# Patient Record
Sex: FEMALE | Race: WHITE | NOT HISPANIC OR LATINO | Employment: OTHER | URBAN - METROPOLITAN AREA
[De-identification: names, ages, dates, MRNs, and addresses within clinical notes are randomized per-mention and may not be internally consistent; named-entity substitution may affect disease eponyms.]

---

## 2020-12-21 ENCOUNTER — TELEPHONE (OUTPATIENT)
Dept: UROLOGY | Facility: CLINIC | Age: 80
End: 2020-12-21

## 2020-12-21 NOTE — TELEPHONE ENCOUNTER
----- Message from Connie Hernandez sent at 12/21/2020 10:22 AM CST -----  Regarding: External Urology Referral  Good morning,    Dr. Ora Burger referring patient to Dr. Main at [facility] for primary clear cell carcinoma of left kidney. Referral scanned into media mgr. Can you review and assist with scheduling?      Connie Stoddard

## 2020-12-21 NOTE — TELEPHONE ENCOUNTER
Called pt informed her that our first available appointment is 12/29/2020 with Dr Main as a new patient. Pt ok with appointment time and date.

## 2020-12-29 ENCOUNTER — OFFICE VISIT (OUTPATIENT)
Dept: UROLOGY | Facility: CLINIC | Age: 80
End: 2020-12-29
Payer: MEDICARE

## 2020-12-29 VITALS
BODY MASS INDEX: 33.32 KG/M2 | SYSTOLIC BLOOD PRESSURE: 130 MMHG | HEIGHT: 65 IN | TEMPERATURE: 97 F | OXYGEN SATURATION: 95 % | DIASTOLIC BLOOD PRESSURE: 60 MMHG | WEIGHT: 200 LBS | HEART RATE: 82 BPM

## 2020-12-29 DIAGNOSIS — N95.2 ATROPHIC VAGINITIS: ICD-10-CM

## 2020-12-29 DIAGNOSIS — R31.0 GROSS HEMATURIA: Primary | ICD-10-CM

## 2020-12-29 DIAGNOSIS — C64.9 RENAL CELL CARCINOMA, UNSPECIFIED LATERALITY: ICD-10-CM

## 2020-12-29 PROCEDURE — 1159F PR MEDICATION LIST DOCUMENTED IN MEDICAL RECORD: ICD-10-PCS | Mod: ,,, | Performed by: UROLOGY

## 2020-12-29 PROCEDURE — 99213 OFFICE O/P EST LOW 20 MIN: CPT | Mod: ,,, | Performed by: UROLOGY

## 2020-12-29 PROCEDURE — 1101F PR PT FALLS ASSESS DOC 0-1 FALLS W/OUT INJ PAST YR: ICD-10-PCS | Mod: CPTII,,, | Performed by: UROLOGY

## 2020-12-29 PROCEDURE — 99213 PR OFFICE/OUTPT VISIT, EST, LEVL III, 20-29 MIN: ICD-10-PCS | Mod: ,,, | Performed by: UROLOGY

## 2020-12-29 PROCEDURE — 88112 PR  CYTOPATH, CELL ENHANCE TECH: ICD-10-PCS | Mod: 26,,, | Performed by: PATHOLOGY

## 2020-12-29 PROCEDURE — 88112 CYTOPATH CELL ENHANCE TECH: CPT | Mod: 26,,, | Performed by: PATHOLOGY

## 2020-12-29 PROCEDURE — 99999 PR PBB SHADOW E&M-EST. PATIENT-LVL IV: ICD-10-PCS | Mod: PBBFAC,,, | Performed by: UROLOGY

## 2020-12-29 PROCEDURE — 1101F PT FALLS ASSESS-DOCD LE1/YR: CPT | Mod: CPTII,,, | Performed by: UROLOGY

## 2020-12-29 PROCEDURE — 1126F AMNT PAIN NOTED NONE PRSNT: CPT | Mod: ,,, | Performed by: UROLOGY

## 2020-12-29 PROCEDURE — 3288F PR FALLS RISK ASSESSMENT DOCUMENTED: ICD-10-PCS | Mod: CPTII,,, | Performed by: UROLOGY

## 2020-12-29 PROCEDURE — 3288F FALL RISK ASSESSMENT DOCD: CPT | Mod: CPTII,,, | Performed by: UROLOGY

## 2020-12-29 PROCEDURE — 1159F MED LIST DOCD IN RCRD: CPT | Mod: ,,, | Performed by: UROLOGY

## 2020-12-29 PROCEDURE — 1126F PR PAIN SEVERITY QUANTIFIED, NO PAIN PRESENT: ICD-10-PCS | Mod: ,,, | Performed by: UROLOGY

## 2020-12-29 PROCEDURE — 99999 PR PBB SHADOW E&M-EST. PATIENT-LVL IV: CPT | Mod: PBBFAC,,, | Performed by: UROLOGY

## 2020-12-29 PROCEDURE — 88112 CYTOPATH CELL ENHANCE TECH: CPT | Performed by: PATHOLOGY

## 2020-12-29 RX ORDER — PRAMIPEXOLE DIHYDROCHLORIDE 0.12 MG/1
2 TABLET ORAL 3 TIMES DAILY
COMMUNITY
Start: 2020-10-29

## 2020-12-29 RX ORDER — GABAPENTIN 300 MG/1
300 CAPSULE ORAL 3 TIMES DAILY
COMMUNITY
Start: 2020-10-29

## 2020-12-29 RX ORDER — ACETAMINOPHEN AND CODEINE PHOSPHATE 300; 30 MG/1; MG/1
1 TABLET ORAL EVERY 6 HOURS PRN
COMMUNITY
Start: 2020-12-17

## 2020-12-29 RX ORDER — ROSUVASTATIN CALCIUM 40 MG/1
40 TABLET, COATED ORAL DAILY
COMMUNITY
Start: 2020-10-26

## 2020-12-29 RX ORDER — INSULIN GLARGINE 100 [IU]/ML
100 INJECTION, SOLUTION SUBCUTANEOUS NIGHTLY
COMMUNITY
Start: 2020-11-21

## 2020-12-29 RX ORDER — OMEPRAZOLE 40 MG/1
40 CAPSULE, DELAYED RELEASE ORAL DAILY
COMMUNITY
Start: 2020-10-23

## 2020-12-29 RX ORDER — LEVOTHYROXINE SODIUM 75 UG/1
75 TABLET ORAL DAILY
COMMUNITY
Start: 2020-08-04

## 2020-12-29 RX ORDER — AMLODIPINE BESYLATE 10 MG/1
10 TABLET ORAL DAILY
COMMUNITY
Start: 2020-04-01

## 2020-12-29 RX ORDER — SITAGLIPTIN 100 MG/1
1 TABLET, FILM COATED ORAL DAILY
COMMUNITY
Start: 2020-10-29

## 2020-12-29 RX ORDER — ISOSORBIDE MONONITRATE 30 MG/1
30 TABLET, EXTENDED RELEASE ORAL DAILY
COMMUNITY
Start: 2020-07-20

## 2020-12-29 RX ORDER — NEBIVOLOL HYDROCHLORIDE 5 MG/1
5 TABLET ORAL DAILY
COMMUNITY
Start: 2020-10-29 | End: 2023-01-11

## 2020-12-29 NOTE — LETTER
December 30, 2020      Ora Burger MD  [address]  [address]  [place] [address]           [place] [facility]  [address] [place] [address]  Phone: [phone]          Patient: Adriana Lopez   MR Number: [id number]   YOB: 1940   Date of Visit: 12/29/2020       Dear Dr. Ora Burger:    Thank you for referring Adriana Lopez to me for evaluation. Attached you will find relevant portions of my assessment and plan of care.    If you have questions, please do not hesitate to call me. I look forward to following Adriana Lopez along with you.    Sincerely,    Alanis Main MD    Enclosure  CC:  No Recipients    If you would like to receive this communication electronically, please contact [email or web address] or [phone] to request more information on [facility] [employer] access.    For providers and/or their staff who would like to refer a patient to [facility], please contact us through our one-stop-shop provider referral line, [facility], at [phone].    If you feel you have received this communication in error or would no longer like to receive these types of communications, please e-mail [email or web address]

## 2020-12-29 NOTE — PROGRESS NOTES
Chief Complaint   Patient presents with    referal/ clear cell caricinoma of the LT kidney         History of Present Illness:   Adriana Lopez is a 80 y.o. female here for follow up.   She underwent CT/cysto for gross hematuria 6/2020. Started on premarin cream for atrophic vaginitis.   Was found to be anemic by Dr. Lerner and is having an MRI of her liver soon. Using vag est as prescribed.   She denies any gross hematuria. Only having abdominal pain from hernia, but not otherwise.   No gross hematuria or flank pain. No dysuria.     Left partial nephrectomy 2012 at [facility] by Dr. 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    Review of Systems:   Constitutional: Pt denies fever, chills, change in appetite or unintentional weight loss  HENT: No drooling, loss of hearing, mouth sores, facial swelling  Eyes: No photophobia, visual disturbance or eye pain  Respiratory: No cough, dyspnea, wheezing  Cardiovascular: No chest pain, palpitations or leg swelling  GI: No constipation, diarrhea, nausea, vomiting, melena or hematochezia  : No genital lesions, discharge, nocturnal enuresis  Endocrine: No polydipsia, polyphagia, heat or cold intolerance  Musculoskeletal: +back pain  Integument: No color change, rash or wounds  Neurological: No seizures, speech difficulty or tremors  Psychiatric: No confusion, self-harm or Hallucinations    Current Outpatient Medications   Medication Sig Dispense Refill    acetaminophen-codeine 300-30mg (TYLENOL #3) 300-30 mg Tab Take 1 tablet by mouth every 6 (six) hours as needed.      amLODIPine (NORVASC) 10 MG tablet Take 10 mg by mouth once daily.      BYSTOLIC 5 mg Tab Take 5 mg by mouth once daily.      gabapentin (NEURONTIN) 300 MG capsule Take 300 mg by mouth 3 (three) times daily.      isosorbide mononitrate (IMDUR) 30 MG 24 hr tablet Take 30 mg by mouth once daily.      JANUVIA 100 mg Tab Take 1 tablet by mouth once daily.      LANTUS SOLOSTAR U-100 INSULIN glargine 100  units/mL (3mL) SubQ pen Inject 100 Units into the skin nightly.      levothyroxine (SYNTHROID) 75 MCG tablet Take 75 mcg by mouth once daily.      omeprazole (PRILOSEC) 40 MG capsule Take 40 mg by mouth once daily.      pramipexole (MIRAPEX) 0.125 MG tablet Take 2 tablets by mouth 3 (three) times daily. 0.125 mg tab      rosuvastatin (CRESTOR) 40 MG Tab Take 40 mg by mouth once daily.       No current facility-administered medications for this visit.        Review of patient's allergies indicates:   Allergen Reactions    Canagliflozin Itching    Iron      Stomach issues    Itraconazole Diarrhea    Nitrofurantoin     Povidone-iodine     Shellfish derived     Alendronate Nausea And Vomiting    Codeine Nausea And Vomiting    Diphenhydramine hcl Anxiety    Hydrocodone Nausea And Vomiting    Iodine and iodide containing products Itching and Rash    Latex, natural rubber Rash    Macrolide antibiotics Nausea Only    Metformin Nausea And Vomiting    Oxycodone-acetaminophen Nausea And Vomiting    Penicillins Nausea And Vomiting    Raloxifene Nausea Only    Ropinirole Nausea Only    Sulfa (sulfonamide antibiotics) Nausea And Vomiting       Past medical, family, and social history reviewed as documented in chart with pertinent positive medical, family, and social history detailed in HPI.    Physical Exam  Vitals:    12/29/20 1336   BP: 130/60   Pulse: 82   Temp: 97.2 °F (36.2 °C)       General: Well-developed, Well Nourished in No acute distress  HEENT: Normocephalic, Atraumatic, Extraocular Movements intact  Neck: Supple, trachea midline, no cervical or supraclavicular lymphadenopathy  Respirations: Even and unlabored  Back: Midline spine without tenderness, no CVA tenderness  Abdomen: Soft, Non-tender, non-distended, palpable tip of the liver, no rebound or guarding, no palpable masses  Extremities: Moves all equally, atraumatic, no clubbing, cyanosis or edema  Skin: warm and dry  Psych: normal  affect  Neuro: Alert and oriented x 3, Cranial nerves II-XII grossly intact      Urinalysis  Trace protein, negative for LE, nit and blood    Assesment:   1. Gross hematuria  Cytology, Urine    CANCELED: Cytology, Urine   2. Atrophic vaginitis     3. Renal cell carcinoma, unspecified laterality           Plan:  Gross hematuria  -     Cytology, Urine  -     Cancel: Cytology, Urine    Atrophic vaginitis    Renal cell carcinoma, unspecified laterality    continue vaginal estrogen  Urine for cytology  Follow up in about 6 months (around 6/29/2021).

## 2020-12-30 PROBLEM — I15.2 HYPERTENSION ASSOCIATED WITH DIABETES: Status: ACTIVE | Noted: 2020-12-30

## 2020-12-30 PROBLEM — D69.6 THROMBOCYTOPENIA: Status: ACTIVE | Noted: 2019-10-09

## 2020-12-30 PROBLEM — E11.42 DIABETIC PERIPHERAL NEUROPATHY ASSOCIATED WITH TYPE 2 DIABETES MELLITUS: Status: ACTIVE | Noted: 2020-12-30

## 2020-12-30 PROBLEM — Z23 NEED FOR INFLUENZA VACCINATION: Status: ACTIVE | Noted: 2017-12-07

## 2020-12-30 PROBLEM — Z79.4 INSULIN LONG-TERM USE: Status: ACTIVE | Noted: 2017-08-14

## 2020-12-30 PROBLEM — E11.59 HYPERTENSION ASSOCIATED WITH DIABETES: Status: ACTIVE | Noted: 2020-12-30

## 2020-12-30 PROBLEM — B36.1: Status: ACTIVE | Noted: 2017-04-17

## 2020-12-30 PROBLEM — K31.7 POLYP OF STOMACH AND DUODENUM: Status: ACTIVE | Noted: 2019-09-26

## 2020-12-30 PROBLEM — E11.29 MICROALBUMINURIA DUE TO TYPE 2 DIABETES MELLITUS: Status: ACTIVE | Noted: 2017-12-07

## 2020-12-30 PROBLEM — B37.9: Status: ACTIVE | Noted: 2017-02-06

## 2020-12-30 PROBLEM — G47.00 INSOMNIA: Status: ACTIVE | Noted: 2017-05-09

## 2020-12-30 PROBLEM — Z02.89 PAIN MEDICATION AGREEMENT SIGNED: Status: ACTIVE | Noted: 2017-08-14

## 2020-12-30 PROBLEM — B49 MYCOSIS: Status: ACTIVE | Noted: 2017-03-17

## 2020-12-30 PROBLEM — S22.080A COMPRESSION FRACTURE OF T12 VERTEBRA: Status: ACTIVE | Noted: 2020-05-28

## 2020-12-30 PROBLEM — R80.9 MICROALBUMINURIA DUE TO TYPE 2 DIABETES MELLITUS: Status: ACTIVE | Noted: 2017-12-07

## 2020-12-30 PROBLEM — K25.3 ANTRAL ULCER, ACUTE: Status: ACTIVE | Noted: 2019-02-28

## 2020-12-30 PROBLEM — E53.8 LOW SERUM VITAMIN B12: Status: ACTIVE | Noted: 2019-02-12

## 2020-12-30 PROBLEM — C82.09: Status: ACTIVE | Noted: 2017-09-30

## 2020-12-30 PROBLEM — G25.81 RESTLESS LEG: Status: ACTIVE | Noted: 2017-04-19

## 2020-12-30 PROBLEM — M85.88 OSTEOPENIA OF LUMBAR SPINE: Status: ACTIVE | Noted: 2018-01-07

## 2020-12-30 PROBLEM — Z79.82 ON ASPIRIN AT HOME: Status: ACTIVE | Noted: 2017-09-30

## 2020-12-30 PROBLEM — R91.8 PULMONARY NODULES: Status: ACTIVE | Noted: 2017-02-02

## 2020-12-30 PROBLEM — K74.69 OTHER CIRRHOSIS OF LIVER: Status: ACTIVE | Noted: 2019-02-28

## 2020-12-30 PROBLEM — R06.00 DYSPNEA: Status: ACTIVE | Noted: 2018-11-02

## 2020-12-30 PROBLEM — G47.33 OSA (OBSTRUCTIVE SLEEP APNEA): Status: ACTIVE | Noted: 2020-12-30

## 2020-12-30 PROBLEM — R26.81 GAIT INSTABILITY: Status: ACTIVE | Noted: 2017-02-02

## 2020-12-30 PROBLEM — I34.0 NON-RHEUMATIC MITRAL REGURGITATION: Status: ACTIVE | Noted: 2019-05-13

## 2020-12-30 PROBLEM — E66.01 MORBIDLY OBESE: Status: ACTIVE | Noted: 2019-10-09

## 2020-12-30 PROBLEM — E03.8 ADULT ONSET HYPOTHYROIDISM: Status: ACTIVE | Noted: 2020-12-30

## 2020-12-30 LAB — FINAL PATHOLOGIC DIAGNOSIS: NORMAL

## 2021-05-15 ENCOUNTER — OUTSIDE PLACE OF SERVICE (OUTPATIENT)
Dept: CARDIOLOGY | Facility: CLINIC | Age: 81
End: 2021-05-15
Payer: MEDICARE

## 2021-05-15 PROCEDURE — 93010 PR ELECTROCARDIOGRAM REPORT: ICD-10-PCS | Mod: ,,, | Performed by: INTERNAL MEDICINE

## 2021-05-15 PROCEDURE — 93010 ELECTROCARDIOGRAM REPORT: CPT | Mod: ,,, | Performed by: INTERNAL MEDICINE

## 2021-05-17 ENCOUNTER — OUTSIDE PLACE OF SERVICE (OUTPATIENT)
Dept: CARDIOLOGY | Facility: CLINIC | Age: 81
End: 2021-05-17
Payer: MEDICARE

## 2021-05-17 PROCEDURE — 99222 1ST HOSP IP/OBS MODERATE 55: CPT | Mod: ,,, | Performed by: INTERNAL MEDICINE

## 2021-05-17 PROCEDURE — 99222 PR INITIAL HOSPITAL CARE,LEVL II: ICD-10-PCS | Mod: ,,, | Performed by: INTERNAL MEDICINE

## 2021-06-29 ENCOUNTER — OFFICE VISIT (OUTPATIENT)
Dept: UROLOGY | Facility: CLINIC | Age: 81
End: 2021-06-29
Payer: MEDICARE

## 2021-06-29 VITALS
DIASTOLIC BLOOD PRESSURE: 78 MMHG | HEIGHT: 65 IN | WEIGHT: 166.69 LBS | BODY MASS INDEX: 27.77 KG/M2 | SYSTOLIC BLOOD PRESSURE: 140 MMHG

## 2021-06-29 DIAGNOSIS — R31.0 GROSS HEMATURIA: Primary | ICD-10-CM

## 2021-06-29 DIAGNOSIS — N30.01 ACUTE CYSTITIS WITH HEMATURIA: ICD-10-CM

## 2021-06-29 PROCEDURE — 87088 URINE BACTERIA CULTURE: CPT | Performed by: UROLOGY

## 2021-06-29 PROCEDURE — 99999 PR PBB SHADOW E&M-EST. PATIENT-LVL III: ICD-10-PCS | Mod: PBBFAC,,, | Performed by: UROLOGY

## 2021-06-29 PROCEDURE — 1159F MED LIST DOCD IN RCRD: CPT | Mod: S$GLB,,, | Performed by: UROLOGY

## 2021-06-29 PROCEDURE — 99999 PR PBB SHADOW E&M-EST. PATIENT-LVL III: CPT | Mod: PBBFAC,,, | Performed by: UROLOGY

## 2021-06-29 PROCEDURE — 99213 OFFICE O/P EST LOW 20 MIN: CPT | Mod: S$GLB,,, | Performed by: UROLOGY

## 2021-06-29 PROCEDURE — 87086 URINE CULTURE/COLONY COUNT: CPT | Performed by: UROLOGY

## 2021-06-29 PROCEDURE — 1125F AMNT PAIN NOTED PAIN PRSNT: CPT | Mod: S$GLB,,, | Performed by: UROLOGY

## 2021-06-29 PROCEDURE — 99213 PR OFFICE/OUTPT VISIT, EST, LEVL III, 20-29 MIN: ICD-10-PCS | Mod: S$GLB,,, | Performed by: UROLOGY

## 2021-06-29 PROCEDURE — 87186 SC STD MICRODIL/AGAR DIL: CPT | Performed by: UROLOGY

## 2021-06-29 PROCEDURE — 87077 CULTURE AEROBIC IDENTIFY: CPT | Performed by: UROLOGY

## 2021-06-29 PROCEDURE — 1159F PR MEDICATION LIST DOCUMENTED IN MEDICAL RECORD: ICD-10-PCS | Mod: S$GLB,,, | Performed by: UROLOGY

## 2021-06-29 PROCEDURE — 1125F PR PAIN SEVERITY QUANTIFIED, PAIN PRESENT: ICD-10-PCS | Mod: S$GLB,,, | Performed by: UROLOGY

## 2021-06-29 RX ORDER — PRASUGREL 10 MG/1
TABLET, FILM COATED ORAL
COMMUNITY
End: 2023-01-11

## 2021-06-29 RX ORDER — PANTOPRAZOLE SODIUM 40 MG/1
1 TABLET, DELAYED RELEASE ORAL DAILY
COMMUNITY
Start: 2021-05-31 | End: 2023-01-11

## 2021-06-29 RX ORDER — PEN NEEDLE, DIABETIC 30 GX3/16"
NEEDLE, DISPOSABLE MISCELLANEOUS
COMMUNITY
Start: 2021-04-22

## 2021-06-29 RX ORDER — ISAVUCONAZONIUM SULFATE 186 MG/1
CAPSULE ORAL
COMMUNITY
End: 2023-01-11

## 2021-06-29 RX ORDER — TEMAZEPAM 15 MG/1
CAPSULE ORAL
COMMUNITY

## 2021-06-29 RX ORDER — LIDOCAINE 50 MG/G
PATCH TOPICAL
COMMUNITY
Start: 2021-01-20

## 2021-06-29 RX ORDER — RALOXIFENE HYDROCHLORIDE 60 MG/1
TABLET, FILM COATED ORAL
COMMUNITY

## 2021-06-29 RX ORDER — CEFDINIR 300 MG/1
300 CAPSULE ORAL 2 TIMES DAILY
Qty: 14 CAPSULE | Refills: 0 | Status: SHIPPED | OUTPATIENT
Start: 2021-06-29 | End: 2021-07-06

## 2021-06-29 RX ORDER — ONDANSETRON 8 MG/1
TABLET, ORALLY DISINTEGRATING ORAL
COMMUNITY

## 2021-06-29 RX ORDER — AZATHIOPRINE 50 MG/1
100 TABLET ORAL
COMMUNITY
Start: 2021-06-01 | End: 2023-01-11

## 2021-06-29 RX ORDER — METOPROLOL SUCCINATE 100 MG/1
1 TABLET, EXTENDED RELEASE ORAL DAILY
COMMUNITY
Start: 2021-06-01 | End: 2023-01-11

## 2021-06-29 RX ORDER — TALC
1 POWDER (GRAM) TOPICAL NIGHTLY PRN
COMMUNITY
Start: 2021-05-04

## 2021-06-29 RX ORDER — PREDNISONE 20 MG/1
TABLET ORAL
COMMUNITY

## 2021-06-29 RX ORDER — NITROGLYCERIN 0.4 MG/1
TABLET SUBLINGUAL
COMMUNITY
Start: 2021-01-12

## 2021-06-29 RX ORDER — POTASSIUM CHLORIDE 1500 MG/1
TABLET, EXTENDED RELEASE ORAL
COMMUNITY

## 2021-06-29 RX ORDER — LORAZEPAM 1 MG/1
TABLET ORAL
COMMUNITY

## 2021-06-29 RX ORDER — INSULIN LISPRO 100 [IU]/ML
INJECTION, SOLUTION INTRAVENOUS; SUBCUTANEOUS
COMMUNITY
Start: 2021-05-04 | End: 2023-01-11

## 2021-06-29 RX ORDER — FUROSEMIDE 20 MG/1
TABLET ORAL
COMMUNITY
Start: 2021-05-31

## 2021-06-29 RX ORDER — FOLIC ACID 1 MG/1
1 TABLET ORAL DAILY
COMMUNITY
Start: 2021-06-01 | End: 2023-01-11

## 2021-06-29 RX ORDER — POLYETHYLENE GLYCOL 3350 17 G/17G
POWDER, FOR SOLUTION ORAL
COMMUNITY

## 2021-06-29 RX ORDER — INSULIN ASPART 100 [IU]/ML
INJECTION, SOLUTION INTRAVENOUS; SUBCUTANEOUS
COMMUNITY

## 2021-06-29 RX ORDER — PROMETHAZINE HYDROCHLORIDE 12.5 MG/1
TABLET ORAL
COMMUNITY

## 2021-07-02 LAB — BACTERIA UR CULT: ABNORMAL

## 2021-12-30 ENCOUNTER — OFFICE VISIT (OUTPATIENT)
Dept: UROLOGY | Facility: CLINIC | Age: 81
End: 2021-12-30
Payer: MEDICARE

## 2021-12-30 VITALS
WEIGHT: 167.31 LBS | BODY MASS INDEX: 28.28 KG/M2 | DIASTOLIC BLOOD PRESSURE: 70 MMHG | SYSTOLIC BLOOD PRESSURE: 130 MMHG

## 2021-12-30 DIAGNOSIS — N95.2 ATROPHIC VAGINITIS: ICD-10-CM

## 2021-12-30 DIAGNOSIS — R31.0 GROSS HEMATURIA: ICD-10-CM

## 2021-12-30 DIAGNOSIS — C64.2 RENAL CELL CARCINOMA OF LEFT KIDNEY: Primary | ICD-10-CM

## 2021-12-30 PROCEDURE — 3075F SYST BP GE 130 - 139MM HG: CPT | Mod: CPTII,S$GLB,, | Performed by: UROLOGY

## 2021-12-30 PROCEDURE — 3075F PR MOST RECENT SYSTOLIC BLOOD PRESS GE 130-139MM HG: ICD-10-PCS | Mod: CPTII,S$GLB,, | Performed by: UROLOGY

## 2021-12-30 PROCEDURE — 1159F MED LIST DOCD IN RCRD: CPT | Mod: CPTII,S$GLB,, | Performed by: UROLOGY

## 2021-12-30 PROCEDURE — 1101F PR PT FALLS ASSESS DOC 0-1 FALLS W/OUT INJ PAST YR: ICD-10-PCS | Mod: CPTII,S$GLB,, | Performed by: UROLOGY

## 2021-12-30 PROCEDURE — 1159F PR MEDICATION LIST DOCUMENTED IN MEDICAL RECORD: ICD-10-PCS | Mod: CPTII,S$GLB,, | Performed by: UROLOGY

## 2021-12-30 PROCEDURE — 3078F PR MOST RECENT DIASTOLIC BLOOD PRESSURE < 80 MM HG: ICD-10-PCS | Mod: CPTII,S$GLB,, | Performed by: UROLOGY

## 2021-12-30 PROCEDURE — 99213 PR OFFICE/OUTPT VISIT, EST, LEVL III, 20-29 MIN: ICD-10-PCS | Mod: S$GLB,,, | Performed by: UROLOGY

## 2021-12-30 PROCEDURE — 99999 PR PBB SHADOW E&M-EST. PATIENT-LVL IV: ICD-10-PCS | Mod: PBBFAC,,, | Performed by: UROLOGY

## 2021-12-30 PROCEDURE — 1126F AMNT PAIN NOTED NONE PRSNT: CPT | Mod: CPTII,S$GLB,, | Performed by: UROLOGY

## 2021-12-30 PROCEDURE — 3288F PR FALLS RISK ASSESSMENT DOCUMENTED: ICD-10-PCS | Mod: CPTII,S$GLB,, | Performed by: UROLOGY

## 2021-12-30 PROCEDURE — 3288F FALL RISK ASSESSMENT DOCD: CPT | Mod: CPTII,S$GLB,, | Performed by: UROLOGY

## 2021-12-30 PROCEDURE — 99213 OFFICE O/P EST LOW 20 MIN: CPT | Mod: S$GLB,,, | Performed by: UROLOGY

## 2021-12-30 PROCEDURE — 99999 PR PBB SHADOW E&M-EST. PATIENT-LVL IV: CPT | Mod: PBBFAC,,, | Performed by: UROLOGY

## 2021-12-30 PROCEDURE — 3078F DIAST BP <80 MM HG: CPT | Mod: CPTII,S$GLB,, | Performed by: UROLOGY

## 2021-12-30 PROCEDURE — 1101F PT FALLS ASSESS-DOCD LE1/YR: CPT | Mod: CPTII,S$GLB,, | Performed by: UROLOGY

## 2021-12-30 PROCEDURE — 1126F PR PAIN SEVERITY QUANTIFIED, NO PAIN PRESENT: ICD-10-PCS | Mod: CPTII,S$GLB,, | Performed by: UROLOGY

## 2022-07-07 ENCOUNTER — HOSPITAL ENCOUNTER (OUTPATIENT)
Dept: RADIOLOGY | Facility: HOSPITAL | Age: 82
Discharge: HOME | End: 2022-07-07
Attending: UROLOGY
Payer: MEDICARE

## 2022-07-07 ENCOUNTER — OFFICE VISIT (OUTPATIENT)
Dept: UROLOGY | Facility: CLINIC | Age: 82
End: 2022-07-07
Payer: MEDICARE

## 2022-07-07 VITALS
WEIGHT: 153.25 LBS | SYSTOLIC BLOOD PRESSURE: 120 MMHG | BODY MASS INDEX: 25.89 KG/M2 | DIASTOLIC BLOOD PRESSURE: 70 MMHG

## 2022-07-07 DIAGNOSIS — R31.0 GROSS HEMATURIA: Primary | ICD-10-CM

## 2022-07-07 DIAGNOSIS — C64.2: ICD-10-CM

## 2022-07-07 DIAGNOSIS — C64.2 RENAL CELL CARCINOMA OF LEFT KIDNEY: ICD-10-CM

## 2022-07-07 PROCEDURE — 99999 PR PBB SHADOW E&M-EST. PATIENT-LVL II: ICD-10-PCS | Mod: PBBFAC,,, | Performed by: UROLOGY

## 2022-07-07 PROCEDURE — 99999 PR PBB SHADOW E&M-EST. PATIENT-LVL II: CPT | Mod: PBBFAC,,, | Performed by: UROLOGY

## 2022-07-07 PROCEDURE — 99213 OFFICE O/P EST LOW 20 MIN: CPT | Mod: S$GLB,,, | Performed by: UROLOGY

## 2022-07-07 PROCEDURE — 3074F PR MOST RECENT SYSTOLIC BLOOD PRESSURE < 130 MM HG: ICD-10-PCS | Mod: CPTII,S$GLB,, | Performed by: UROLOGY

## 2022-07-07 PROCEDURE — 1126F AMNT PAIN NOTED NONE PRSNT: CPT | Mod: CPTII,S$GLB,, | Performed by: UROLOGY

## 2022-07-07 PROCEDURE — 3288F PR FALLS RISK ASSESSMENT DOCUMENTED: ICD-10-PCS | Mod: CPTII,S$GLB,, | Performed by: UROLOGY

## 2022-07-07 PROCEDURE — 1126F PR PAIN SEVERITY QUANTIFIED, NO PAIN PRESENT: ICD-10-PCS | Mod: CPTII,S$GLB,, | Performed by: UROLOGY

## 2022-07-07 PROCEDURE — 3078F DIAST BP <80 MM HG: CPT | Mod: CPTII,S$GLB,, | Performed by: UROLOGY

## 2022-07-07 PROCEDURE — 1101F PT FALLS ASSESS-DOCD LE1/YR: CPT | Mod: CPTII,S$GLB,, | Performed by: UROLOGY

## 2022-07-07 PROCEDURE — 76770 US EXAM ABDO BACK WALL COMP: CPT | Mod: TC,PN

## 2022-07-07 PROCEDURE — 3074F SYST BP LT 130 MM HG: CPT | Mod: CPTII,S$GLB,, | Performed by: UROLOGY

## 2022-07-07 PROCEDURE — 3078F PR MOST RECENT DIASTOLIC BLOOD PRESSURE < 80 MM HG: ICD-10-PCS | Mod: CPTII,S$GLB,, | Performed by: UROLOGY

## 2022-07-07 PROCEDURE — 1101F PR PT FALLS ASSESS DOC 0-1 FALLS W/OUT INJ PAST YR: ICD-10-PCS | Mod: CPTII,S$GLB,, | Performed by: UROLOGY

## 2022-07-07 PROCEDURE — 3288F FALL RISK ASSESSMENT DOCD: CPT | Mod: CPTII,S$GLB,, | Performed by: UROLOGY

## 2022-07-07 PROCEDURE — 99213 PR OFFICE/OUTPT VISIT, EST, LEVL III, 20-29 MIN: ICD-10-PCS | Mod: S$GLB,,, | Performed by: UROLOGY

## 2022-07-07 RX ORDER — ERGOCALCIFEROL 1.25 MG/1
CAPSULE ORAL
COMMUNITY
Start: 2022-07-01

## 2022-07-07 RX ORDER — DICLOFENAC SODIUM 10 MG/G
2 GEL TOPICAL
COMMUNITY
Start: 2021-11-15 | End: 2023-01-11

## 2022-07-07 RX ORDER — SODIUM, POTASSIUM,MAG SULFATES 17.5-3.13G
SOLUTION, RECONSTITUTED, ORAL ORAL
COMMUNITY
Start: 2022-03-18 | End: 2023-01-11

## 2022-07-07 RX ORDER — IRON,CARBONYL/ASCORBIC ACID 100-250 MG
TABLET ORAL
COMMUNITY
Start: 2022-01-20

## 2022-07-07 RX ORDER — TERIPARATIDE 250 UG/ML
20 INJECTION, SOLUTION SUBCUTANEOUS
COMMUNITY
Start: 2022-05-16

## 2022-07-07 RX ORDER — CALCITONIN SALMON 200 [IU]/.09ML
1 SPRAY, METERED NASAL
COMMUNITY
Start: 2021-08-12 | End: 2023-01-11

## 2022-07-07 NOTE — PROGRESS NOTES
"  F/u hematuria, RCC    History of Present Illness:   Adriana Lopez is a 81 y.o. female here for follow up.       7/7/22-No dysuria or gross hematuria. Sometimes she has urgency if she waits too long. Uses premarin cream "sparingly" because she forgets. No UTIs. Renal US personally reviewed, showing no solid renal mass, stone or hydronephrosis.   12/30/21-Uses premarin cream only once a month because she forgets. Reports that she has a lumbar spinal fracture. No recent UTIs. No gross hematuria. No dysuria.   6/29/21-She reports having a fall and femur fracture s/p right femoral neck pinning and then was transferred back to the hospital with A-fib.   States that she has a "nasty backache". No fever. No dysuria. No gross hematuria. No suprapubic pain. States that she did have a catheter in the hospital. Stopped the vaginal estrogen cream with everything that was going on.     Past  history:   She underwent CT/cysto for gross hematuria 6/2020.   Left partial nephrectomy 2012 at [facility] by Dr. 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   HENT: Negative for nosebleeds.    Respiratory: Negative for shortness of breath.    Cardiovascular: Negative for chest pain.   Gastrointestinal: Negative for anal bleeding, blood in stool, nausea and vomiting.   Genitourinary: Negative for dysuria and hematuria.   Musculoskeletal: Positive for back pain and gait problem.   All other systems reviewed and are negative.      Current Outpatient Medications   Medication Sig Dispense Refill    acetaminophen-codeine 300-30mg (TYLENOL #3) 300-30 mg Tab Take 1 tablet by mouth every 6 (six) hours as needed.      amLODIPine (NORVASC) 10 MG tablet Take 10 mg by mouth once daily.      apixaban (ELIQUIS) 5 mg Tab Take 1 tablet by mouth 2 (two) times daily.      BYSTOLIC 5 mg Tab Take 5 mg by mouth once daily.      calcitonin, salmon, (FORTICAL) 200 unit/actuation nasal " "spray 1 spray by Nasal route.      diclofenac sodium (VOLTAREN) 1 % Gel Apply 2 g topically.      furosemide (LASIX) 20 MG tablet       gabapentin (NEURONTIN) 300 MG capsule Take 300 mg by mouth 3 (three) times daily.      insulin aspart U-100 (NOVOLOG) 100 unit/mL (3 mL) InPn pen Novolog Flexpen U-100 Insulin aspart 100 unit/mL (3 mL) subcutaneous      isavuconazonium sulfate (CRESEMBA) 186 mg Cap Cresemba 186 mg capsule   Take 1 capsule twice a day by oral route as directed for 30 days.      isosorbide mononitrate (IMDUR) 30 MG 24 hr tablet Take 30 mg by mouth once daily.      JANUVIA 100 mg Tab Take 1 tablet by mouth once daily.      LANTUS SOLOSTAR U-100 INSULIN glargine 100 units/mL (3mL) SubQ pen Inject 100 Units into the skin nightly.      levothyroxine (SYNTHROID) 75 MCG tablet Take 75 mcg by mouth once daily.      LIDOcaine (LIDODERM) 5 %       LORazepam (ATIVAN) 1 MG tablet 1 N/A daily.      melatonin (MELATIN) 3 mg tablet Take 1 tablet by mouth nightly as needed.      nitroGLYCERIN (NITROSTAT) 0.4 MG SL tablet PLACE 1 TABLET UNDER THE TONGUE EVERY 5 MINUTES AS NEEDED FOR CHEST PAIN FOR UP TO 3 DOSES AS DIRECTED; CALL 911 IF PAIN DOESN'T GO AWAY      omeprazole (PRILOSEC) 40 MG capsule Take 40 mg by mouth once daily.      ondansetron (ZOFRAN-ODT) 8 MG TbDL ondansetron 8 mg disintegrating tablet      pen needle, diabetic 31 gauge x 3/16" Ndle USE FOUR TIMES DAILY AS DIRECTED.      polyethylene glycol (GLYCOLAX) 17 gram/dose powder Miralax 17 gram/dose oral powder   use as directed      potassium chloride (K-TAB) 20 mEq potassium chloride ER 20 mEq tablet,extended release   TAKE 1 TABLET BY MOUTH DAILY      pramipexole (MIRAPEX) 0.125 MG tablet Take 2 tablets by mouth 3 (three) times daily. 0.125 mg tab      prasugreL (EFFIENT) 10 mg Tab Effient 10 mg tablet      predniSONE (DELTASONE) 20 MG tablet prednisone 20 mg tablet   TAKE 1 TABLET BY MOUTH DAILY      promethazine (PHENERGAN) 12.5 " MG Tab promethazine 12.5 mg tablet      raloxifene (EVISTA) 60 mg tablet raloxifene 60 mg tablet      rosuvastatin (CRESTOR) 40 MG Tab Take 40 mg by mouth once daily.      temazepam (RESTORIL) 15 mg Cap temazepam 15 mg capsule      teriparatide (FORTEO) 20 mcg/dose (600mcg/2.4mL) PnIj Inject 20 mcg into the skin.      azaTHIOprine (IMURAN) 50 mg Tab Take 100 mg by mouth.      ergocalciferol (ERGOCALCIFEROL) 50,000 unit Cap       folic acid (FOLVITE) 1 MG tablet Take 1 tablet by mouth once daily.      insulin lispro 100 unit/mL injection Inject into the skin.      iron-vitamin C 100-250 mg, ICAR-C, 100-250 mg Tab       metoprolol succinate (TOPROL-XL) 100 MG 24 hr tablet Take 1 tablet by mouth once daily.      pantoprazole (PROTONIX) 40 MG tablet Take 1 tablet by mouth once daily.      SUPREP BOWEL PREP KIT 17.5-3.13-1.6 gram SolR        No current facility-administered medications for this visit.       Review of patient's allergies indicates:   Allergen Reactions    Canagliflozin Itching    Iron      Stomach issues    Itraconazole Diarrhea    Povidone-iodine     Shellfish derived     Alendronate Nausea And Vomiting    Codeine Nausea And Vomiting    Diphenhydramine hcl Anxiety    Hydrocodone Nausea And Vomiting    Iodine and iodide containing products Itching and Rash    Latex, natural rubber Rash    Macrolide antibiotics Nausea Only    Metformin Nausea And Vomiting    Nitrofurantoin Nausea And Vomiting    Oxycodone-acetaminophen Nausea And Vomiting    Penicillins Nausea And Vomiting    Raloxifene Nausea Only    Ropinirole Nausea Only    Sulfa (sulfonamide antibiotics) Nausea And Vomiting       Past medical, family, and social history reviewed as documented in chart with pertinent positive medical, family, and social history detailed in HPI.    Physical Exam  Vitals:    07/07/22 1112   BP: 120/70       General: Well-developed, Well Nourished in No acute distress  HEENT: Normocephalic,  Atraumatic, Extraocular Movements intact  Neck: Supple, trachea midline, no cervical or supraclavicular lymphadenopathy  Respirations: Even and unlabored  Extremities: Moves all equally, atraumatic, no clubbing, cyanosis or edema  Skin: warm and dry  Psych: normal affect  Neuro: Alert and oriented x 3, Cranial nerves II-XII grossly intact      Urinalysis  Negative for blood, LE, nit    Assesment:   1. Gross hematuria     2. Primary clear cell carcinoma of left kidney           Plan:  Gross hematuria    Primary clear cell carcinoma of left kidney       okay to stop vaginal estrogen for now    Follow up in about 6 months (around 1/7/2023).

## 2023-01-11 ENCOUNTER — OFFICE VISIT (OUTPATIENT)
Dept: UROLOGY | Facility: CLINIC | Age: 83
End: 2023-01-11
Payer: MEDICARE

## 2023-01-11 VITALS
BODY MASS INDEX: 27.81 KG/M2 | HEART RATE: 72 BPM | DIASTOLIC BLOOD PRESSURE: 79 MMHG | WEIGHT: 166.88 LBS | HEIGHT: 65 IN | SYSTOLIC BLOOD PRESSURE: 184 MMHG

## 2023-01-11 DIAGNOSIS — R31.0 GROSS HEMATURIA: Primary | ICD-10-CM

## 2023-01-11 DIAGNOSIS — N39.3 SUI (STRESS URINARY INCONTINENCE, FEMALE): ICD-10-CM

## 2023-01-11 DIAGNOSIS — C64.2 RENAL CELL CARCINOMA OF LEFT KIDNEY: ICD-10-CM

## 2023-01-11 LAB
BILIRUB SERPL-MCNC: NORMAL MG/DL
BLOOD URINE, POC: NORMAL
CLARITY, POC UA: NORMAL
COLOR, POC UA: NORMAL
GLUCOSE UR QL STRIP: NORMAL
KETONES UR QL STRIP: NORMAL
LEUKOCYTE ESTERASE URINE, POC: NORMAL
NITRITE, POC UA: POSITIVE
PH, POC UA: 5
PROTEIN, POC: NORMAL
SPECIFIC GRAVITY, POC UA: 1.02
UROBILINOGEN, POC UA: NORMAL

## 2023-01-11 PROCEDURE — 1160F RVW MEDS BY RX/DR IN RCRD: CPT | Mod: CPTII,S$GLB,, | Performed by: UROLOGY

## 2023-01-11 PROCEDURE — 3078F DIAST BP <80 MM HG: CPT | Mod: CPTII,S$GLB,, | Performed by: UROLOGY

## 2023-01-11 PROCEDURE — 1159F PR MEDICATION LIST DOCUMENTED IN MEDICAL RECORD: ICD-10-PCS | Mod: CPTII,S$GLB,, | Performed by: UROLOGY

## 2023-01-11 PROCEDURE — 99213 PR OFFICE/OUTPT VISIT, EST, LEVL III, 20-29 MIN: ICD-10-PCS | Mod: S$GLB,,, | Performed by: UROLOGY

## 2023-01-11 PROCEDURE — 3077F PR MOST RECENT SYSTOLIC BLOOD PRESSURE >= 140 MM HG: ICD-10-PCS | Mod: CPTII,S$GLB,, | Performed by: UROLOGY

## 2023-01-11 PROCEDURE — 81002 POCT URINE DIPSTICK WITHOUT MICROSCOPE: ICD-10-PCS | Mod: S$GLB,,, | Performed by: UROLOGY

## 2023-01-11 PROCEDURE — 1160F PR REVIEW ALL MEDS BY PRESCRIBER/CLIN PHARMACIST DOCUMENTED: ICD-10-PCS | Mod: CPTII,S$GLB,, | Performed by: UROLOGY

## 2023-01-11 PROCEDURE — 3288F FALL RISK ASSESSMENT DOCD: CPT | Mod: CPTII,S$GLB,, | Performed by: UROLOGY

## 2023-01-11 PROCEDURE — 1159F MED LIST DOCD IN RCRD: CPT | Mod: CPTII,S$GLB,, | Performed by: UROLOGY

## 2023-01-11 PROCEDURE — 99213 OFFICE O/P EST LOW 20 MIN: CPT | Mod: S$GLB,,, | Performed by: UROLOGY

## 2023-01-11 PROCEDURE — 1126F AMNT PAIN NOTED NONE PRSNT: CPT | Mod: CPTII,S$GLB,, | Performed by: UROLOGY

## 2023-01-11 PROCEDURE — 99999 PR PBB SHADOW E&M-EST. PATIENT-LVL V: ICD-10-PCS | Mod: PBBFAC,,, | Performed by: UROLOGY

## 2023-01-11 PROCEDURE — 81002 URINALYSIS NONAUTO W/O SCOPE: CPT | Mod: S$GLB,,, | Performed by: UROLOGY

## 2023-01-11 PROCEDURE — 1101F PT FALLS ASSESS-DOCD LE1/YR: CPT | Mod: CPTII,S$GLB,, | Performed by: UROLOGY

## 2023-01-11 PROCEDURE — 99999 PR PBB SHADOW E&M-EST. PATIENT-LVL V: CPT | Mod: PBBFAC,,, | Performed by: UROLOGY

## 2023-01-11 PROCEDURE — 1101F PR PT FALLS ASSESS DOC 0-1 FALLS W/OUT INJ PAST YR: ICD-10-PCS | Mod: CPTII,S$GLB,, | Performed by: UROLOGY

## 2023-01-11 PROCEDURE — 3078F PR MOST RECENT DIASTOLIC BLOOD PRESSURE < 80 MM HG: ICD-10-PCS | Mod: CPTII,S$GLB,, | Performed by: UROLOGY

## 2023-01-11 PROCEDURE — 3288F PR FALLS RISK ASSESSMENT DOCUMENTED: ICD-10-PCS | Mod: CPTII,S$GLB,, | Performed by: UROLOGY

## 2023-01-11 PROCEDURE — 3077F SYST BP >= 140 MM HG: CPT | Mod: CPTII,S$GLB,, | Performed by: UROLOGY

## 2023-01-11 PROCEDURE — 1126F PR PAIN SEVERITY QUANTIFIED, NO PAIN PRESENT: ICD-10-PCS | Mod: CPTII,S$GLB,, | Performed by: UROLOGY

## 2023-01-11 NOTE — PROGRESS NOTES
"  F/u hematuria, RCC    History of Present Illness:   Adriana Lopez is a 82 y.o. female here for follow up.     1/11/23- no recent gross hematuria. No dysuria or recent UTI. Sometimes she has incontinence when she first gets up and starts walking. 3 Pads per day. No urgency.   7/7/22-No dysuria or gross hematuria. Sometimes she has urgency if she waits too long. Uses premarin cream "sparingly" because she forgets. No UTIs. Renal US personally reviewed, showing no solid renal mass, stone or hydronephrosis.   12/30/21-Uses premarin cream only once a month because she forgets. Reports that she has a lumbar spinal fracture. No recent UTIs. No gross hematuria. No dysuria.   6/29/21-She reports having a fall and femur fracture s/p right femoral neck pinning and then was transferred back to the hospital with A-fib.   States that she has a "nasty backache". No fever. No dysuria. No gross hematuria. No suprapubic pain. States that she did have a catheter in the hospital. Stopped the vaginal estrogen cream with everything that was going on.     Past  history:   She underwent CT/cysto for gross hematuria 6/2020.   Left partial nephrectomy 2012 at [facility] by Dr. 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   HENT:  Negative for nosebleeds.    Respiratory:  Negative for shortness of breath.    Cardiovascular:  Negative for chest pain.   Gastrointestinal:  Negative for anal bleeding, blood in stool, nausea and vomiting.   Genitourinary:  Negative for dysuria and hematuria.   Musculoskeletal:  Positive for back pain and gait problem.   All other systems reviewed and are negative.    Current Outpatient Medications   Medication Sig Dispense Refill    acetaminophen-codeine 300-30mg (TYLENOL #3) 300-30 mg Tab Take 1 tablet by mouth every 6 (six) hours as needed.      amLODIPine (NORVASC) 10 MG tablet Take 10 mg by mouth once daily.      azaTHIOprine (IMURAN) 50 " "mg Tab Take 100 mg by mouth.      ergocalciferol (ERGOCALCIFEROL) 50,000 unit Cap       folic acid (FOLVITE) 1 MG tablet Take 1 tablet by mouth once daily.      furosemide (LASIX) 20 MG tablet       gabapentin (NEURONTIN) 300 MG capsule Take 300 mg by mouth 3 (three) times daily.      insulin aspart U-100 (NOVOLOG) 100 unit/mL (3 mL) InPn pen Novolog Flexpen U-100 Insulin aspart 100 unit/mL (3 mL) subcutaneous      iron-vitamin C 100-250 mg, ICAR-C, 100-250 mg Tab       isosorbide mononitrate (IMDUR) 30 MG 24 hr tablet Take 30 mg by mouth once daily.      JANUVIA 100 mg Tab Take 1 tablet by mouth once daily.      LANTUS SOLOSTAR U-100 INSULIN glargine 100 units/mL (3mL) SubQ pen Inject 100 Units into the skin nightly.      levothyroxine (SYNTHROID) 75 MCG tablet Take 75 mcg by mouth once daily.      LIDOcaine (LIDODERM) 5 %       LORazepam (ATIVAN) 1 MG tablet 1 N/A daily.      melatonin (MELATIN) 3 mg tablet Take 1 tablet by mouth nightly as needed.      metoprolol succinate (TOPROL-XL) 100 MG 24 hr tablet Take 1 tablet by mouth once daily.      nitroGLYCERIN (NITROSTAT) 0.4 MG SL tablet PLACE 1 TABLET UNDER THE TONGUE EVERY 5 MINUTES AS NEEDED FOR CHEST PAIN FOR UP TO 3 DOSES AS DIRECTED; CALL 911 IF PAIN DOESN'T GO AWAY      omeprazole (PRILOSEC) 40 MG capsule Take 40 mg by mouth once daily.      ondansetron (ZOFRAN-ODT) 8 MG TbDL ondansetron 8 mg disintegrating tablet      pen needle, diabetic 31 gauge x 3/16" Ndle USE FOUR TIMES DAILY AS DIRECTED.      polyethylene glycol (GLYCOLAX) 17 gram/dose powder Miralax 17 gram/dose oral powder   use as directed      potassium chloride (K-TAB) 20 mEq potassium chloride ER 20 mEq tablet,extended release   TAKE 1 TABLET BY MOUTH DAILY      pramipexole (MIRAPEX) 0.125 MG tablet Take 2 tablets by mouth 3 (three) times daily. 0.125 mg tab      promethazine (PHENERGAN) 12.5 MG Tab promethazine 12.5 mg tablet      rosuvastatin (CRESTOR) 40 MG Tab Take 40 mg by mouth once daily. "      temazepam (RESTORIL) 15 mg Cap temazepam 15 mg capsule      teriparatide (FORTEO) 20 mcg/dose (600mcg/2.4mL) PnIj Inject 20 mcg into the skin.      apixaban (ELIQUIS) 5 mg Tab Take 1 tablet by mouth 2 (two) times daily.      predniSONE (DELTASONE) 20 MG tablet prednisone 20 mg tablet   TAKE 1 TABLET BY MOUTH DAILY      raloxifene (EVISTA) 60 mg tablet raloxifene 60 mg tablet       No current facility-administered medications for this visit.       Review of patient's allergies indicates:   Allergen Reactions    Canagliflozin Itching    Iron      Stomach issues    Itraconazole Diarrhea    Povidone-iodine     Shellfish derived     Alendronate Nausea And Vomiting    Codeine Nausea And Vomiting    Diphenhydramine hcl Anxiety    Hydrocodone Nausea And Vomiting    Iodine and iodide containing products Itching and Rash    Latex, natural rubber Rash    Macrolide antibiotics Nausea Only    Metformin Nausea And Vomiting    Nitrofurantoin Nausea And Vomiting    Oxycodone-acetaminophen Nausea And Vomiting    Penicillins Nausea And Vomiting    Raloxifene Nausea Only    Ropinirole Nausea Only    Sulfa (sulfonamide antibiotics) Nausea And Vomiting       Past medical, family, and social history reviewed as documented in chart with pertinent positive medical, family, and social history detailed in HPI.    Physical Exam  Vitals:    01/11/23 1046   BP: (!) 184/79   Pulse: 72       General: Well-developed, Well Nourished in No acute distress  HEENT: Normocephalic, Atraumatic, Extraocular Movements intact  Neck: Supple, trachea midline, no cervical or supraclavicular lymphadenopathy  Respirations: Even and unlabored  Back: midline spine, no CVA tenderness  Abd: soft, NTND, no masses  Extremities: Moves all equally, atraumatic, no clubbing, cyanosis or edema  Skin: warm and dry  Psych: normal affect  Neuro: Alert and oriented x 3, Cranial nerves II-XII grossly intact    UA: negative for blood    Assesment:   1. Gross hematuria  POCT  URINE DIPSTICK WITHOUT MICROSCOPE    US Retroperitoneal Complete      2. HERMES (stress urinary incontinence, female)        3. Renal cell carcinoma of left kidney  US Retroperitoneal Complete            Plan:  Gross hematuria  -     POCT URINE DIPSTICK WITHOUT MICROSCOPE  -     US Retroperitoneal Complete; Future; Expected date: 01/11/2023    HERMES (stress urinary incontinence, female)    Renal cell carcinoma of left kidney  -     US Retroperitoneal Complete; Future; Expected date: 01/11/2023       Discussed kegel exercises and pelvic floor PT. Educational material provided.     Follow up in about 6 months (around 7/11/2023).

## 2023-05-20 DIAGNOSIS — R92.0 MICROCALCIFICATION OF RIGHT BREAST ON MAMMOGRAPHY: Primary | ICD-10-CM

## 2023-05-20 PROBLEM — Z80.3 FAMILY HISTORY OF BREAST CANCER: Status: ACTIVE | Noted: 2023-05-20

## 2023-05-20 NOTE — PROGRESS NOTES
[facility]  Dayday Hinojosa MD, FACS  Derrick Liu NP-C      Chief Complaint:   Adriana Lopez is a 82 y.o. female presenting today for her annual evaluation.  She is due for mammogram. She reports no interval changes.     History of Present Illness:   Mrs. Lopez first presented on September 16, 2013 after her right mammogram demonstrated an area of suspicious microcalcifications. Stereotactic biopsy showed a hyalinized fibroadenoma and fibrocystic changes. She has a history of lymphoma s/p chemotherapy. MD:::Ora Burger MD; Dylan Hopkins MD; Adrian Pratt MD.    Past Medical History:   Diagnosis Date    Family history of breast cancer 5/20/2023    Kidney stones     Microcalcification of right breast on mammography 5/20/2023      Past Surgical History:   Procedure Laterality Date    Clear cell carcinoma of left kidney      PARTIAL NEPHRECTOMY  2012        Current Outpatient Medications:     acetaminophen-codeine 300-30mg (TYLENOL #3) 300-30 mg Tab, Take 1 tablet by mouth every 6 (six) hours as needed., Disp: , Rfl:     amLODIPine (NORVASC) 10 MG tablet, Take 10 mg by mouth once daily., Disp: , Rfl:     apixaban (ELIQUIS) 5 mg Tab, Take 1 tablet by mouth 2 (two) times daily., Disp: , Rfl:     azaTHIOprine (IMURAN) 50 mg Tab, Take 100 mg by mouth., Disp: , Rfl:     ergocalciferol (ERGOCALCIFEROL) 50,000 unit Cap, , Disp: , Rfl:     folic acid (FOLVITE) 1 MG tablet, Take 1 tablet by mouth once daily., Disp: , Rfl:     furosemide (LASIX) 20 MG tablet, , Disp: , Rfl:     gabapentin (NEURONTIN) 300 MG capsule, Take 300 mg by mouth 3 (three) times daily., Disp: , Rfl:     insulin aspart U-100 (NOVOLOG) 100 unit/mL (3 mL) InPn pen, Novolog Flexpen U-100 Insulin aspart 100 unit/mL (3 mL) subcutaneous, Disp: , Rfl:     iron-vitamin C 100-250 mg, ICAR-C, 100-250 mg Tab, , Disp: , Rfl:     isosorbide mononitrate (IMDUR) 30 MG 24 hr tablet, Take 30 mg by mouth once daily., Disp: ,  "Rfl:     JANUVIA 100 mg Tab, Take 1 tablet by mouth once daily., Disp: , Rfl:     LANTUS SOLOSTAR U-100 INSULIN glargine 100 units/mL (3mL) SubQ pen, Inject 100 Units into the skin nightly., Disp: , Rfl:     levothyroxine (SYNTHROID) 75 MCG tablet, Take 75 mcg by mouth once daily., Disp: , Rfl:     LIDOcaine (LIDODERM) 5 %, , Disp: , Rfl:     LORazepam (ATIVAN) 1 MG tablet, 1 N/A daily., Disp: , Rfl:     melatonin (MELATIN) 3 mg tablet, Take 1 tablet by mouth nightly as needed., Disp: , Rfl:     metoprolol succinate (TOPROL-XL) 100 MG 24 hr tablet, Take 1 tablet by mouth once daily., Disp: , Rfl:     nitroGLYCERIN (NITROSTAT) 0.4 MG SL tablet, PLACE 1 TABLET UNDER THE TONGUE EVERY 5 MINUTES AS NEEDED FOR CHEST PAIN FOR UP TO 3 DOSES AS DIRECTED; CALL 911 IF PAIN DOESN'T GO AWAY, Disp: , Rfl:     omeprazole (PRILOSEC) 40 MG capsule, Take 40 mg by mouth once daily., Disp: , Rfl:     ondansetron (ZOFRAN-ODT) 8 MG TbDL, ondansetron 8 mg disintegrating tablet, Disp: , Rfl:     pen needle, diabetic 31 gauge x 3/16" Ndle, USE FOUR TIMES DAILY AS DIRECTED., Disp: , Rfl:     polyethylene glycol (GLYCOLAX) 17 gram/dose powder, Miralax 17 gram/dose oral powder  use as directed, Disp: , Rfl:     potassium chloride (K-TAB) 20 mEq, potassium chloride ER 20 mEq tablet,extended release  TAKE 1 TABLET BY MOUTH DAILY, Disp: , Rfl:     pramipexole (MIRAPEX) 0.125 MG tablet, Take 2 tablets by mouth 3 (three) times daily. 0.125 mg tab, Disp: , Rfl:     predniSONE (DELTASONE) 20 MG tablet, prednisone 20 mg tablet  TAKE 1 TABLET BY MOUTH DAILY, Disp: , Rfl:     promethazine (PHENERGAN) 12.5 MG Tab, promethazine 12.5 mg tablet, Disp: , Rfl:     raloxifene (EVISTA) 60 mg tablet, raloxifene 60 mg tablet, Disp: , Rfl:     rosuvastatin (CRESTOR) 40 MG Tab, Take 40 mg by mouth once daily., Disp: , Rfl:     temazepam (RESTORIL) 15 mg Cap, temazepam 15 mg capsule, Disp: , Rfl:     teriparatide (FORTEO) 20 mcg/dose (600mcg/2.4mL) PnIj, Inject 20 " mcg into the skin., Disp: , Rfl:    Review of patient's allergies indicates:   Allergen Reactions    Canagliflozin Itching    Iron      Stomach issues    Itraconazole Diarrhea    Povidone-iodine     Shellfish derived     Alendronate Nausea And Vomiting    Codeine Nausea And Vomiting    Diphenhydramine hcl Anxiety    Hydrocodone Nausea And Vomiting    Iodine and iodide containing products Itching and Rash    Latex, natural rubber Rash    Macrolide antibiotics Nausea Only    Metformin Nausea And Vomiting    Nitrofurantoin Nausea And Vomiting    Oxycodone-acetaminophen Nausea And Vomiting    Penicillins Nausea And Vomiting    Raloxifene Nausea Only    Ropinirole Nausea Only    Sulfa (sulfonamide antibiotics) Nausea And Vomiting      Social History     Tobacco Use    Smoking status: Never    Smokeless tobacco: Never   Substance Use Topics    Alcohol use: Not on file      History reviewed. No pertinent family history.     Review of Systems   Integumentary:  Negative for color change, rash, mole/lesion, breast mass, breast discharge and breast tenderness.   Breast: Negative for mass and tenderness     Physical Exam   HENT:   Head: Normocephalic.   Pulmonary/Chest: Right breast exhibits no inverted nipple, no mass, no nipple discharge, no skin change and no tenderness. Left breast exhibits no inverted nipple, no mass, no nipple discharge, no skin change and no tenderness. No breast swelling.   Genitourinary: No breast swelling.   Musculoskeletal: Lymphadenopathy:      Upper Body:      Right upper body: No supraclavicular or axillary adenopathy.      Left upper body: No supraclavicular or axillary adenopathy.     Neurological: She is alert.      MAMMOGRAM REPORT: She has some diffuse fibronodular tissue bilaterally; there are no spiculated lesions, distortions or suspicious calcifications noted; NEM    ASSESSMENT and PLAN of CARE    1. Microcalcification of right breast on mammography  Assessment & Plan:  We reviewed our  findings today and her questions were answered.  She understands that her imaging and exams have remained stable (and show nothing concerning). She has developed no new areas of suspicious calcifications bilaterally.  She knows that any new or changed calcifications in the future may necessitate the recommendation for tissue sampling.   She is comfortable being followed in a conservative fashion.      She understands the importance of monthly self-breast examination and knows to report any and all changes as they occur.        2. Family history of breast cancer  Assessment & Plan:  We discussed her family history and how it could impact her own future risks.  We discussed family vs. genetic history and the importance and implications of each.  Genetic Counseling/Testing was offered, and all questions answered to her satisfaction.  She knows that as additional family members are diagnosed - she will need to let us know as this may change follow up and imaging recommendations.         Medical Decision Making:  It is my impression that this patient suffers all conditions contained in this medical document.  Each of these conditions did affect our plan of care and my medical decision making today.  It is my opinion that the medical decision making concerning this patient was of minimal  difficulty based on the aforementioned conditions.  Any further recommendations will be communicated to the patient by me.  I have reviewed and verified her allergies, list of medications, medical and surgical histories, social history, and a pertinent review of symptoms.     Follow up: 1 year and PRN    For: PE and GARRET (MONICA) at

## 2023-05-20 NOTE — ASSESSMENT & PLAN NOTE
We reviewed our findings today and her questions were answered.  She understands that her imaging and exams have remained stable (and show nothing concerning). She has developed no new areas of suspicious calcifications bilaterally.  She knows that any new or changed calcifications in the future may necessitate the recommendation for tissue sampling.   She is comfortable being followed in a conservative fashion.      She understands the importance of monthly self-breast examination and knows to report any and all changes as they occur.

## 2023-05-31 ENCOUNTER — OFFICE VISIT (OUTPATIENT)
Dept: SURGERY | Facility: CLINIC | Age: 83
End: 2023-05-31
Payer: MEDICARE

## 2023-05-31 DIAGNOSIS — R92.0 MICROCALCIFICATION OF RIGHT BREAST ON MAMMOGRAPHY: Primary | ICD-10-CM

## 2023-05-31 DIAGNOSIS — Z80.3 FAMILY HISTORY OF BREAST CANCER: ICD-10-CM

## 2023-05-31 PROCEDURE — 1126F PR PAIN SEVERITY QUANTIFIED, NO PAIN PRESENT: ICD-10-PCS | Mod: CPTII,S$GLB,, | Performed by: SPECIALIST

## 2023-05-31 PROCEDURE — 1159F PR MEDICATION LIST DOCUMENTED IN MEDICAL RECORD: ICD-10-PCS | Mod: CPTII,S$GLB,, | Performed by: SPECIALIST

## 2023-05-31 PROCEDURE — 1126F AMNT PAIN NOTED NONE PRSNT: CPT | Mod: CPTII,S$GLB,, | Performed by: SPECIALIST

## 2023-05-31 PROCEDURE — 1159F MED LIST DOCD IN RCRD: CPT | Mod: CPTII,S$GLB,, | Performed by: SPECIALIST

## 2023-05-31 PROCEDURE — 99213 OFFICE O/P EST LOW 20 MIN: CPT | Mod: S$GLB,,, | Performed by: SPECIALIST

## 2023-05-31 PROCEDURE — 1160F RVW MEDS BY RX/DR IN RCRD: CPT | Mod: CPTII,S$GLB,, | Performed by: SPECIALIST

## 2023-05-31 PROCEDURE — 1160F PR REVIEW ALL MEDS BY PRESCRIBER/CLIN PHARMACIST DOCUMENTED: ICD-10-PCS | Mod: CPTII,S$GLB,, | Performed by: SPECIALIST

## 2023-05-31 PROCEDURE — 99213 PR OFFICE/OUTPT VISIT, EST, LEVL III, 20-29 MIN: ICD-10-PCS | Mod: S$GLB,,, | Performed by: SPECIALIST

## 2023-07-07 ENCOUNTER — HOSPITAL ENCOUNTER (OUTPATIENT)
Dept: RADIOLOGY | Facility: HOSPITAL | Age: 83
Discharge: HOME | End: 2023-07-07
Attending: UROLOGY
Payer: MEDICARE

## 2023-07-07 DIAGNOSIS — C64.2 RENAL CELL CARCINOMA OF LEFT KIDNEY: ICD-10-CM

## 2023-07-07 DIAGNOSIS — R31.0 GROSS HEMATURIA: ICD-10-CM

## 2023-07-07 PROCEDURE — 76770 US EXAM ABDO BACK WALL COMP: CPT | Mod: 26,,, | Performed by: RADIOLOGY

## 2023-07-07 PROCEDURE — 76770 US RETROPERITONEAL COMPLETE: ICD-10-PCS | Mod: 26,,, | Performed by: RADIOLOGY

## 2023-07-07 PROCEDURE — 76770 US EXAM ABDO BACK WALL COMP: CPT | Mod: TC,PN

## 2023-07-12 ENCOUNTER — OFFICE VISIT (OUTPATIENT)
Dept: UROLOGY | Facility: CLINIC | Age: 83
End: 2023-07-12
Payer: MEDICARE

## 2023-07-12 VITALS
HEIGHT: 64 IN | RESPIRATION RATE: 18 BRPM | HEART RATE: 88 BPM | WEIGHT: 161.38 LBS | DIASTOLIC BLOOD PRESSURE: 64 MMHG | BODY MASS INDEX: 27.55 KG/M2 | TEMPERATURE: 97 F | SYSTOLIC BLOOD PRESSURE: 163 MMHG

## 2023-07-12 DIAGNOSIS — R31.0 GROSS HEMATURIA: ICD-10-CM

## 2023-07-12 DIAGNOSIS — C64.2 RENAL CELL CARCINOMA OF LEFT KIDNEY: Primary | ICD-10-CM

## 2023-07-12 DIAGNOSIS — N39.3 SUI (STRESS URINARY INCONTINENCE, FEMALE): ICD-10-CM

## 2023-07-12 LAB
BILIRUB SERPL-MCNC: NORMAL MG/DL
BLOOD URINE, POC: NORMAL
CLARITY, POC UA: CLEAR
COLOR, POC UA: YELLOW
GLUCOSE UR QL STRIP: NORMAL
KETONES UR QL STRIP: NORMAL
LEUKOCYTE ESTERASE URINE, POC: NORMAL
NITRITE, POC UA: NORMAL
PH, POC UA: 6
POC RESIDUAL URINE VOLUME: 22 ML (ref 0–100)
PROTEIN, POC: NORMAL
SPECIFIC GRAVITY, POC UA: 1.02
UROBILINOGEN, POC UA: 0.2

## 2023-07-12 PROCEDURE — 1159F PR MEDICATION LIST DOCUMENTED IN MEDICAL RECORD: ICD-10-PCS | Mod: CPTII,S$GLB,, | Performed by: UROLOGY

## 2023-07-12 PROCEDURE — 3078F PR MOST RECENT DIASTOLIC BLOOD PRESSURE < 80 MM HG: ICD-10-PCS | Mod: CPTII,S$GLB,, | Performed by: UROLOGY

## 2023-07-12 PROCEDURE — 99213 OFFICE O/P EST LOW 20 MIN: CPT | Mod: S$GLB,,, | Performed by: UROLOGY

## 2023-07-12 PROCEDURE — 99213 PR OFFICE/OUTPT VISIT, EST, LEVL III, 20-29 MIN: ICD-10-PCS | Mod: S$GLB,,, | Performed by: UROLOGY

## 2023-07-12 PROCEDURE — 3288F PR FALLS RISK ASSESSMENT DOCUMENTED: ICD-10-PCS | Mod: CPTII,S$GLB,, | Performed by: UROLOGY

## 2023-07-12 PROCEDURE — 99999 PR PBB SHADOW E&M-EST. PATIENT-LVL V: ICD-10-PCS | Mod: PBBFAC,,, | Performed by: UROLOGY

## 2023-07-12 PROCEDURE — 3077F PR MOST RECENT SYSTOLIC BLOOD PRESSURE >= 140 MM HG: ICD-10-PCS | Mod: CPTII,S$GLB,, | Performed by: UROLOGY

## 2023-07-12 PROCEDURE — 3288F FALL RISK ASSESSMENT DOCD: CPT | Mod: CPTII,S$GLB,, | Performed by: UROLOGY

## 2023-07-12 PROCEDURE — 51798 POCT BLADDER SCAN: ICD-10-PCS | Mod: S$GLB,,, | Performed by: UROLOGY

## 2023-07-12 PROCEDURE — 3078F DIAST BP <80 MM HG: CPT | Mod: CPTII,S$GLB,, | Performed by: UROLOGY

## 2023-07-12 PROCEDURE — 1126F PR PAIN SEVERITY QUANTIFIED, NO PAIN PRESENT: ICD-10-PCS | Mod: CPTII,S$GLB,, | Performed by: UROLOGY

## 2023-07-12 PROCEDURE — 3077F SYST BP >= 140 MM HG: CPT | Mod: CPTII,S$GLB,, | Performed by: UROLOGY

## 2023-07-12 PROCEDURE — 1159F MED LIST DOCD IN RCRD: CPT | Mod: CPTII,S$GLB,, | Performed by: UROLOGY

## 2023-07-12 PROCEDURE — 1126F AMNT PAIN NOTED NONE PRSNT: CPT | Mod: CPTII,S$GLB,, | Performed by: UROLOGY

## 2023-07-12 PROCEDURE — 1101F PT FALLS ASSESS-DOCD LE1/YR: CPT | Mod: CPTII,S$GLB,, | Performed by: UROLOGY

## 2023-07-12 PROCEDURE — 81002 POCT URINE DIPSTICK WITHOUT MICROSCOPE: ICD-10-PCS | Mod: S$GLB,,, | Performed by: UROLOGY

## 2023-07-12 PROCEDURE — 1101F PR PT FALLS ASSESS DOC 0-1 FALLS W/OUT INJ PAST YR: ICD-10-PCS | Mod: CPTII,S$GLB,, | Performed by: UROLOGY

## 2023-07-12 PROCEDURE — 81002 URINALYSIS NONAUTO W/O SCOPE: CPT | Mod: S$GLB,,, | Performed by: UROLOGY

## 2023-07-12 PROCEDURE — 99999 PR PBB SHADOW E&M-EST. PATIENT-LVL V: CPT | Mod: PBBFAC,,, | Performed by: UROLOGY

## 2023-07-12 PROCEDURE — 51798 US URINE CAPACITY MEASURE: CPT | Mod: S$GLB,,, | Performed by: UROLOGY

## 2023-07-12 NOTE — PROGRESS NOTES
"  F/u hematuria, RCC    History of Present Illness:   Adriana Lopez is a 82 y.o. female here for follow up.     7/12/23-had renal failure following cholecystectomy, but ultimately didn't require dialysis. Also had hepatic failure requiring paracentesis. Now doing better. Renal US completed 7/7/23 personally reviewed, showing no renal mass, stone or hydronephrosis. No gross hematuria. No urgency. No dysuria. No incontinence. She does have some HERMES.   1/11/23- no recent gross hematuria. No dysuria or recent UTI. Sometimes she has incontinence when she first gets up and starts walking. 3 Pads per day. No urgency.   7/7/22-No dysuria or gross hematuria. Sometimes she has urgency if she waits too long. Uses premarin cream "sparingly" because she forgets. No UTIs. Renal US personally reviewed, showing no solid renal mass, stone or hydronephrosis.   12/30/21-Uses premarin cream only once a month because she forgets. Reports that she has a lumbar spinal fracture. No recent UTIs. No gross hematuria. No dysuria.   6/29/21-She reports having a fall and femur fracture s/p right femoral neck pinning and then was transferred back to the hospital with A-fib.   States that she has a "nasty backache". No fever. No dysuria. No gross hematuria. No suprapubic pain. States that she did have a catheter in the hospital. Stopped the vaginal estrogen cream with everything that was going on.     Past  history:   She underwent CT/cysto for gross hematuria 6/2020.   Left partial nephrectomy 2012 at [facility] by Dr. 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    Review of Systems   Constitutional:  Negative for chills and fever.   HENT:  Negative for nosebleeds.    Respiratory:  Negative for shortness of breath.    Cardiovascular:  Negative for chest pain.   Gastrointestinal:  Negative for anal bleeding, blood in stool, nausea and vomiting.   Genitourinary:  Negative for dysuria and hematuria.   Musculoskeletal:  Positive for back pain and " "gait problem.   All other systems reviewed and are negative.    Current Outpatient Medications   Medication Sig Dispense Refill    acetaminophen-codeine 300-30mg (TYLENOL #3) 300-30 mg Tab Take 1 tablet by mouth every 6 (six) hours as needed.      amLODIPine (NORVASC) 10 MG tablet Take 10 mg by mouth once daily.      apixaban (ELIQUIS) 5 mg Tab Take 1 tablet by mouth 2 (two) times daily.      ergocalciferol (ERGOCALCIFEROL) 50,000 unit Cap       furosemide (LASIX) 20 MG tablet       gabapentin (NEURONTIN) 300 MG capsule Take 300 mg by mouth 3 (three) times daily.      insulin aspart U-100 (NOVOLOG) 100 unit/mL (3 mL) InPn pen Novolog Flexpen U-100 Insulin aspart 100 unit/mL (3 mL) subcutaneous      iron-vitamin C 100-250 mg, ICAR-C, 100-250 mg Tab       isosorbide mononitrate (IMDUR) 30 MG 24 hr tablet Take 30 mg by mouth once daily.      JANUVIA 100 mg Tab Take 1 tablet by mouth once daily.      LANTUS SOLOSTAR U-100 INSULIN glargine 100 units/mL (3mL) SubQ pen Inject 100 Units into the skin nightly.      levothyroxine (SYNTHROID) 75 MCG tablet Take 75 mcg by mouth once daily.      LIDOcaine (LIDODERM) 5 %       LORazepam (ATIVAN) 1 MG tablet 1 N/A daily.      melatonin (MELATIN) 3 mg tablet Take 1 tablet by mouth nightly as needed.      nitroGLYCERIN (NITROSTAT) 0.4 MG SL tablet PLACE 1 TABLET UNDER THE TONGUE EVERY 5 MINUTES AS NEEDED FOR CHEST PAIN FOR UP TO 3 DOSES AS DIRECTED; CALL 911 IF PAIN DOESN'T GO AWAY      omeprazole (PRILOSEC) 40 MG capsule Take 40 mg by mouth once daily.      ondansetron (ZOFRAN-ODT) 8 MG TbDL ondansetron 8 mg disintegrating tablet      pen needle, diabetic 31 gauge x 3/16" Ndle USE FOUR TIMES DAILY AS DIRECTED.      polyethylene glycol (GLYCOLAX) 17 gram/dose powder Miralax 17 gram/dose oral powder   use as directed      potassium chloride (K-TAB) 20 mEq potassium chloride ER 20 mEq tablet,extended release   TAKE 1 TABLET BY MOUTH DAILY      pramipexole (MIRAPEX) 0.125 MG tablet " Take 2 tablets by mouth 3 (three) times daily. 0.125 mg tab      predniSONE (DELTASONE) 20 MG tablet prednisone 20 mg tablet   TAKE 1 TABLET BY MOUTH DAILY      promethazine (PHENERGAN) 12.5 MG Tab promethazine 12.5 mg tablet      raloxifene (EVISTA) 60 mg tablet raloxifene 60 mg tablet      rosuvastatin (CRESTOR) 40 MG Tab Take 40 mg by mouth once daily.      temazepam (RESTORIL) 15 mg Cap temazepam 15 mg capsule      teriparatide (FORTEO) 20 mcg/dose (600mcg/2.4mL) PnIj Inject 20 mcg into the skin.      azaTHIOprine (IMURAN) 50 mg Tab Take 100 mg by mouth.      folic acid (FOLVITE) 1 MG tablet Take 1 tablet by mouth once daily.      metoprolol succinate (TOPROL-XL) 100 MG 24 hr tablet Take 1 tablet by mouth once daily.       No current facility-administered medications for this visit.       Review of patient's allergies indicates:   Allergen Reactions    Canagliflozin Itching    Iron      Stomach issues    Itraconazole Diarrhea    Povidone-iodine     Shellfish derived     Alendronate Nausea And Vomiting    Codeine Nausea And Vomiting    Diphenhydramine hcl Anxiety    Hydrocodone Nausea And Vomiting    Iodine and iodide containing products Itching and Rash    Latex, natural rubber Rash    Macrolide antibiotics Nausea Only    Metformin Nausea And Vomiting    Nitrofurantoin Nausea And Vomiting    Oxycodone-acetaminophen Nausea And Vomiting    Penicillins Nausea And Vomiting    Raloxifene Nausea Only    Ropinirole Nausea Only    Sulfa (sulfonamide antibiotics) Nausea And Vomiting       Past medical, family, and social history reviewed as documented in chart with pertinent positive medical, family, and social history detailed in HPI.    Physical Exam  Vitals:    07/12/23 1058   BP: (!) 163/64   Pulse: 88   Resp: 18   Temp: 97.3 °F (36.3 °C)         General: Well-developed, Well Nourished in No acute distress  HEENT: Normocephalic, Atraumatic, Extraocular Movements intact  Neck: Supple, trachea midline, no cervical or  supraclavicular lymphadenopathy  Respirations: Even and unlabored  Back: midline spine, no CVA tenderness  Abd: soft, NTND, no masses, reducible supraumbilical hernia  Extremities: Moves all equally, atraumatic, no clubbing, cyanosis or edema  Skin: warm and dry  Psych: normal affect  Neuro: Alert and oriented x 3, Cranial nerves II-XII grossly intact    UA: negative for blood    Assesment:   1. Renal cell carcinoma of left kidney  US Retroperitoneal Complete      2. Gross hematuria        3. HERMES (stress urinary incontinence, female)                Plan:  Renal cell carcinoma of left kidney  -     US Retroperitoneal Complete; Future; Expected date: 07/11/2024    Gross hematuria    HERMES (stress urinary incontinence, female)             Follow up in about 1 year (around 7/12/2024).

## 2024-05-13 DIAGNOSIS — R92.0 MICROCALCIFICATION OF RIGHT BREAST ON MAMMOGRAPHY: Primary | ICD-10-CM

## 2024-05-13 NOTE — PROGRESS NOTES
Date of Service: 6/3/2024      Chief Complaint:   Adriana Lopez is a 83 y.o. female presenting today for her annual evaluation.  She is due for a mammogram. She reports no interval changes.     History of Present Illness: Mrs. Lopez first presented on September 16, 2013 after her right mammogram demonstrated an area of suspicious microcalcifications. Stereotactic biopsy showed a hyalinized fibroadenoma and fibrocystic changes. She has a history of lymphoma s/p chemotherapy. MD:::Ora Burger MD; Dylan Hopkins MD; Adrian Pratt MD.    Past Medical History:   Diagnosis Date    Family history of breast cancer 5/20/2023    Kidney stones     Microcalcification of right breast on mammography 5/20/2023      Past Surgical History:   Procedure Laterality Date    Clear cell carcinoma of left kidney      PARTIAL NEPHRECTOMY  2012        Current Outpatient Medications:     acetaminophen-codeine 300-30mg (TYLENOL #3) 300-30 mg Tab, Take 1 tablet by mouth every 6 (six) hours as needed., Disp: , Rfl:     amLODIPine (NORVASC) 10 MG tablet, Take 10 mg by mouth once daily., Disp: , Rfl:     apixaban (ELIQUIS) 5 mg Tab, Take 1 tablet by mouth 2 (two) times daily., Disp: , Rfl:     azaTHIOprine (IMURAN) 50 mg Tab, Take 100 mg by mouth., Disp: , Rfl:     ergocalciferol (ERGOCALCIFEROL) 50,000 unit Cap, , Disp: , Rfl:     folic acid (FOLVITE) 1 MG tablet, Take 1 tablet by mouth once daily., Disp: , Rfl:     furosemide (LASIX) 20 MG tablet, , Disp: , Rfl:     gabapentin (NEURONTIN) 300 MG capsule, Take 300 mg by mouth 3 (three) times daily., Disp: , Rfl:     insulin aspart U-100 (NOVOLOG) 100 unit/mL (3 mL) InPn pen, Novolog Flexpen U-100 Insulin aspart 100 unit/mL (3 mL) subcutaneous, Disp: , Rfl:     iron-vitamin C 100-250 mg, ICAR-C, 100-250 mg Tab, , Disp: , Rfl:     isosorbide mononitrate (IMDUR) 30 MG 24 hr tablet, Take 30 mg by mouth once daily., Disp: , Rfl:     JANUVIA 100 mg Tab, Take 1 tablet by mouth once daily.,  "Disp: , Rfl:     LANTUS SOLOSTAR U-100 INSULIN glargine 100 units/mL (3mL) SubQ pen, Inject 100 Units into the skin nightly., Disp: , Rfl:     levothyroxine (SYNTHROID) 75 MCG tablet, Take 75 mcg by mouth once daily., Disp: , Rfl:     LIDOcaine (LIDODERM) 5 %, , Disp: , Rfl:     LORazepam (ATIVAN) 1 MG tablet, 1 N/A daily., Disp: , Rfl:     melatonin (MELATIN) 3 mg tablet, Take 1 tablet by mouth nightly as needed., Disp: , Rfl:     metoprolol succinate (TOPROL-XL) 100 MG 24 hr tablet, Take 1 tablet by mouth once daily., Disp: , Rfl:     nitroGLYCERIN (NITROSTAT) 0.4 MG SL tablet, PLACE 1 TABLET UNDER THE TONGUE EVERY 5 MINUTES AS NEEDED FOR CHEST PAIN FOR UP TO 3 DOSES AS DIRECTED; CALL 911 IF PAIN DOESN'T GO AWAY, Disp: , Rfl:     omeprazole (PRILOSEC) 40 MG capsule, Take 40 mg by mouth once daily., Disp: , Rfl:     ondansetron (ZOFRAN-ODT) 8 MG TbDL, ondansetron 8 mg disintegrating tablet, Disp: , Rfl:     pen needle, diabetic 31 gauge x 3/16" Ndle, USE FOUR TIMES DAILY AS DIRECTED., Disp: , Rfl:     polyethylene glycol (GLYCOLAX) 17 gram/dose powder, Miralax 17 gram/dose oral powder  use as directed, Disp: , Rfl:     potassium chloride (K-TAB) 20 mEq, potassium chloride ER 20 mEq tablet,extended release  TAKE 1 TABLET BY MOUTH DAILY, Disp: , Rfl:     pramipexole (MIRAPEX) 0.125 MG tablet, Take 2 tablets by mouth 3 (three) times daily. 0.125 mg tab, Disp: , Rfl:     predniSONE (DELTASONE) 20 MG tablet, prednisone 20 mg tablet  TAKE 1 TABLET BY MOUTH DAILY, Disp: , Rfl:     promethazine (PHENERGAN) 12.5 MG Tab, promethazine 12.5 mg tablet, Disp: , Rfl:     raloxifene (EVISTA) 60 mg tablet, raloxifene 60 mg tablet, Disp: , Rfl:     rosuvastatin (CRESTOR) 40 MG Tab, Take 40 mg by mouth once daily., Disp: , Rfl:     temazepam (RESTORIL) 15 mg Cap, temazepam 15 mg capsule, Disp: , Rfl:     teriparatide (FORTEO) 20 mcg/dose (600mcg/2.4mL) PnIj, Inject 20 mcg into the skin., Disp: , Rfl:    Review of patient's allergies " indicates:   Allergen Reactions    Canagliflozin Itching    Iron      Stomach issues    Itraconazole Diarrhea    Povidone-iodine     Shellfish derived     Alendronate Nausea And Vomiting    Codeine Nausea And Vomiting    Diphenhydramine hcl Anxiety    Hydrocodone Nausea And Vomiting    Iodine and iodide containing products Itching and Rash    Latex, natural rubber Rash    Macrolide antibiotics Nausea Only    Metformin Nausea And Vomiting    Nitrofurantoin Nausea And Vomiting    Oxycodone-acetaminophen Nausea And Vomiting    Penicillins Nausea And Vomiting    Raloxifene Nausea Only    Ropinirole Nausea Only    Sulfa (sulfonamide antibiotics) Nausea And Vomiting      Social History     Tobacco Use    Smoking status: Never    Smokeless tobacco: Never   Substance Use Topics    Alcohol use: Not on file      No family history on file.     Review of Systems   Integumentary:  Negative for color change, rash, mole/lesion, thickening/swelling, dimpling of skin, drainage  Breast: Negative for mass and tenderness     Physical Exam   Constitutional: She appears well-developed. She is cooperative.   HENT: Normocephalic.   Cardiovascular:  Normal rate and regular rhythm.            Pulmonary/Chest: She exhibits no tenderness and no bony tenderness.   Abdominal: Soft. Normal appearance.   Musculoskeletal: Intact with no deficits  Neurological: She is alert.   Skin: No rash noted.   Breast and Chest Wall Evaluation:   Right breast exhibits no mass, no nipple discharge, no skin change and no tenderness.     Left breast exhibits no mass, no nipple discharge, no skin change and no tenderness.     Lymphadenopathy: No supraclavicular or axillary adenopathy.    MAMMOGRAM REPORT: She has some diffuse fibronodular tissue; there are no spiculated lesions, distortions or suspicious calcifications noted; NEM    ASSESSMENT and PLAN OF CARE     1. Microcalcification of right breast on mammography  Assessment & Plan:  We reviewed our findings  today and her questions were answered.  She understands that her imaging and exams have remained stable (and show nothing concerning). She has developed no new areas of suspicious calcifications bilaterally.  She knows that any new or changed calcifications in the future may necessitate the recommendation for tissue sampling. She is comfortable being followed in a conservative fashion.      She understands the importance of monthly self-breast examination and knows to report any and all changes as they occur.    NOTE:::We viewed her films together at today's visit.  We discussed the multiple views obtained and the important findings.  Even benign changes were mentioned and her questions were answered.  She knows that she may receive a formal letter or report from the Radiologist.  She is to contact us if she has questions.        2. Family history of breast cancer  Assessment & Plan:  We discussed her family history and how it could impact her own future risks.  We discussed family vs. genetic history and the importance and implications of each.  Genetic Counseling/Testing was offered, and all questions answered to her satisfaction.  She knows that as additional family members are diagnosed - she will need to let us know as this may change follow up and imaging recommendations.    We had a discussion concerning Breast Cancer Risk Reduction and current NCCN Guidelines. She knows that her risk can be lowered slightly with a healthy lifestyle and minimal ETOH use. Being physically active will also help. She should reduce or stay away from OCPs and HRT as possible.         Medical Decision Making: It is my impression that this patient suffers all conditions contained in this medical document.  Each of these conditions did affect our plan of care and my medical decision making today.  It is my opinion that the medical decision making concerning this patient was of minimal  difficulty based on the aforementioned  conditions.  Any further recommendations will be communicated to the patient by me.  I have reviewed and verified her allergies, list of medications, medical and surgical histories, social history, and a pertinent review of symptoms.     Follow up: 1 year and PRN    For: Physical Examination and MGM (D) at

## 2024-05-16 NOTE — ASSESSMENT & PLAN NOTE
We reviewed our findings today and her questions were answered.  She understands that her imaging and exams have remained stable (and show nothing concerning). She has developed no new areas of suspicious calcifications bilaterally.  She knows that any new or changed calcifications in the future may necessitate the recommendation for tissue sampling. She is comfortable being followed in a conservative fashion.      She understands the importance of monthly self-breast examination and knows to report any and all changes as they occur.    NOTE:::We viewed her films together at today's visit.  We discussed the multiple views obtained and the important findings.  Even benign changes were mentioned and her questions were answered.  She knows that she may receive a formal letter or report from the Radiologist.  She is to contact us if she has questions.

## 2024-06-03 ENCOUNTER — OFFICE VISIT (OUTPATIENT)
Dept: SURGERY | Facility: CLINIC | Age: 84
End: 2024-06-03
Payer: MEDICARE

## 2024-06-03 DIAGNOSIS — R92.0 MICROCALCIFICATION OF RIGHT BREAST ON MAMMOGRAPHY: Primary | ICD-10-CM

## 2024-06-03 DIAGNOSIS — Z80.3 FAMILY HISTORY OF BREAST CANCER: ICD-10-CM

## 2024-06-03 PROCEDURE — 99213 OFFICE O/P EST LOW 20 MIN: CPT | Mod: S$GLB,,, | Performed by: SPECIALIST

## 2024-06-03 PROCEDURE — 1160F RVW MEDS BY RX/DR IN RCRD: CPT | Mod: CPTII,S$GLB,, | Performed by: SPECIALIST

## 2024-06-03 PROCEDURE — 1126F AMNT PAIN NOTED NONE PRSNT: CPT | Mod: CPTII,S$GLB,, | Performed by: SPECIALIST

## 2024-06-03 PROCEDURE — 99999 PR PBB SHADOW E&M-EST. PATIENT-LVL IV: CPT | Mod: PBBFAC,,, | Performed by: SPECIALIST

## 2024-06-03 PROCEDURE — 1159F MED LIST DOCD IN RCRD: CPT | Mod: CPTII,S$GLB,, | Performed by: SPECIALIST

## 2024-07-11 ENCOUNTER — HOSPITAL ENCOUNTER (OUTPATIENT)
Dept: RADIOLOGY | Facility: HOSPITAL | Age: 84
Discharge: HOME | End: 2024-07-11
Attending: UROLOGY
Payer: MEDICARE

## 2024-07-11 DIAGNOSIS — C64.2 RENAL CELL CARCINOMA OF LEFT KIDNEY: ICD-10-CM

## 2024-07-11 PROCEDURE — 76770 US EXAM ABDO BACK WALL COMP: CPT | Mod: 26,,, | Performed by: RADIOLOGY

## 2024-07-11 PROCEDURE — 76770 US EXAM ABDO BACK WALL COMP: CPT | Mod: TC,PN

## 2024-07-12 ENCOUNTER — OFFICE VISIT (OUTPATIENT)
Dept: UROLOGY | Facility: CLINIC | Age: 84
End: 2024-07-12
Payer: MEDICARE

## 2024-07-12 VITALS
HEART RATE: 78 BPM | WEIGHT: 156.06 LBS | BODY MASS INDEX: 26.64 KG/M2 | SYSTOLIC BLOOD PRESSURE: 145 MMHG | HEIGHT: 64 IN | RESPIRATION RATE: 18 BRPM | DIASTOLIC BLOOD PRESSURE: 85 MMHG

## 2024-07-12 DIAGNOSIS — R31.0 GROSS HEMATURIA: ICD-10-CM

## 2024-07-12 DIAGNOSIS — R39.15 URINARY URGENCY: ICD-10-CM

## 2024-07-12 DIAGNOSIS — C64.2 RENAL CELL CARCINOMA OF LEFT KIDNEY: Primary | ICD-10-CM

## 2024-07-12 LAB
BILIRUB UR QL STRIP: NEGATIVE
GLUCOSE UR QL STRIP: NEGATIVE
KETONES UR QL STRIP: NEGATIVE
LEUKOCYTE ESTERASE UR QL STRIP: POSITIVE
PH, POC UA: 5.5
POC BLOOD, URINE: NEGATIVE
POC NITRATES, URINE: NEGATIVE
PROT UR QL STRIP: NEGATIVE
SP GR UR STRIP: 1.02 (ref 1–1.03)
UROBILINOGEN UR STRIP-ACNC: 0.2 (ref 0.1–1.1)

## 2024-07-12 PROCEDURE — 99999 PR PBB SHADOW E&M-EST. PATIENT-LVL V: CPT | Mod: PBBFAC,,, | Performed by: UROLOGY

## 2024-07-12 NOTE — PROGRESS NOTES
"  F/u hematuria, RCC    History of Present Illness:   Adriana Lopez is a 83 y.o. female here for follow up.     7/12/24-no gross hematuria. Had blood transfusion for anemia. Renal US personally reviewed, showing possible 3mm L renal stone. No solid mass or hydronephrosis. Some urgency if she waits too long. She wears a pad just in case.   7/12/23-had renal failure following cholecystectomy, but ultimately didn't require dialysis. Also had hepatic failure requiring paracentesis. Now doing better. Renal US completed 7/7/23 personally reviewed, showing no renal mass, stone or hydronephrosis. No gross hematuria. No urgency. No dysuria. No incontinence. She does have some HERMES.   1/11/23- no recent gross hematuria. No dysuria or recent UTI. Sometimes she has incontinence when she first gets up and starts walking. 3 Pads per day. No urgency.   7/7/22-No dysuria or gross hematuria. Sometimes she has urgency if she waits too long. Uses premarin cream "sparingly" because she forgets. No UTIs. Renal US personally reviewed, showing no solid renal mass, stone or hydronephrosis.   12/30/21-Uses premarin cream only once a month because she forgets. Reports that she has a lumbar spinal fracture. No recent UTIs. No gross hematuria. No dysuria.   6/29/21-She reports having a fall and femur fracture s/p right femoral neck pinning and then was transferred back to the hospital with A-fib.   States that she has a "nasty backache". No fever. No dysuria. No gross hematuria. No suprapubic pain. States that she did have a catheter in the hospital. Stopped the vaginal estrogen cream with everything that was going on.     Past  history:   She underwent CT/cysto for gross hematuria 6/2020.   Left partial nephrectomy 2012 at [facility] by Dr. 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   HENT:  Negative for nosebleeds.    Respiratory:  Negative for shortness of breath.  " "  Cardiovascular:  Negative for chest pain.   Gastrointestinal:  Negative for abdominal pain, anal bleeding, blood in stool, nausea and vomiting.   Genitourinary:  Negative for dysuria, flank pain and hematuria.   Musculoskeletal:  Positive for back pain and gait problem.   All other systems reviewed and are negative.      Current Outpatient Medications   Medication Sig Dispense Refill    acetaminophen-codeine 300-30mg (TYLENOL #3) 300-30 mg Tab Take 1 tablet by mouth every 6 (six) hours as needed.      amLODIPine (NORVASC) 10 MG tablet Take 10 mg by mouth once daily.      apixaban (ELIQUIS) 5 mg Tab Take 1 tablet by mouth 2 (two) times daily.      ergocalciferol (ERGOCALCIFEROL) 50,000 unit Cap       furosemide (LASIX) 20 MG tablet       gabapentin (NEURONTIN) 300 MG capsule Take 300 mg by mouth 3 (three) times daily.      insulin aspart U-100 (NOVOLOG) 100 unit/mL (3 mL) InPn pen Novolog Flexpen U-100 Insulin aspart 100 unit/mL (3 mL) subcutaneous      iron-vitamin C 100-250 mg, ICAR-C, 100-250 mg Tab       isosorbide mononitrate (IMDUR) 30 MG 24 hr tablet Take 30 mg by mouth once daily.      JANUVIA 100 mg Tab Take 1 tablet by mouth once daily.      LANTUS SOLOSTAR U-100 INSULIN glargine 100 units/mL (3mL) SubQ pen Inject 100 Units into the skin nightly.      levothyroxine (SYNTHROID) 75 MCG tablet Take 75 mcg by mouth once daily.      LIDOcaine (LIDODERM) 5 %       LORazepam (ATIVAN) 1 MG tablet 1 N/A daily.      melatonin (MELATIN) 3 mg tablet Take 1 tablet by mouth nightly as needed.      nitroGLYCERIN (NITROSTAT) 0.4 MG SL tablet PLACE 1 TABLET UNDER THE TONGUE EVERY 5 MINUTES AS NEEDED FOR CHEST PAIN FOR UP TO 3 DOSES AS DIRECTED; CALL 911 IF PAIN DOESN'T GO AWAY      omeprazole (PRILOSEC) 40 MG capsule Take 40 mg by mouth once daily.      ondansetron (ZOFRAN-ODT) 8 MG TbDL ondansetron 8 mg disintegrating tablet      pen needle, diabetic 31 gauge x 3/16" Ndle USE FOUR TIMES DAILY AS DIRECTED.      " polyethylene glycol (GLYCOLAX) 17 gram/dose powder Miralax 17 gram/dose oral powder   use as directed      potassium chloride (K-TAB) 20 mEq potassium chloride ER 20 mEq tablet,extended release   TAKE 1 TABLET BY MOUTH DAILY      pramipexole (MIRAPEX) 0.125 MG tablet Take 2 tablets by mouth 3 (three) times daily. 0.125 mg tab      predniSONE (DELTASONE) 20 MG tablet prednisone 20 mg tablet   TAKE 1 TABLET BY MOUTH DAILY      promethazine (PHENERGAN) 12.5 MG Tab promethazine 12.5 mg tablet      raloxifene (EVISTA) 60 mg tablet raloxifene 60 mg tablet      rosuvastatin (CRESTOR) 40 MG Tab Take 40 mg by mouth once daily.      temazepam (RESTORIL) 15 mg Cap temazepam 15 mg capsule      teriparatide (FORTEO) 20 mcg/dose (600mcg/2.4mL) PnIj Inject 20 mcg into the skin.      azaTHIOprine (IMURAN) 50 mg Tab Take 100 mg by mouth.      folic acid (FOLVITE) 1 MG tablet Take 1 tablet by mouth once daily.      metoprolol succinate (TOPROL-XL) 100 MG 24 hr tablet Take 1 tablet by mouth once daily.       No current facility-administered medications for this visit.       Review of patient's allergies indicates:   Allergen Reactions    Canagliflozin Itching    Iron      Stomach issues    Itraconazole Diarrhea    Povidone-iodine     Shellfish derived     Alendronate Nausea And Vomiting    Codeine Nausea And Vomiting    Diphenhydramine hcl Anxiety    Hydrocodone Nausea And Vomiting    Iodine and iodide containing products Itching and Rash    Latex, natural rubber Rash    Macrolide antibiotics Nausea Only    Metformin Nausea And Vomiting    Nitrofurantoin Nausea And Vomiting    Oxycodone-acetaminophen Nausea And Vomiting    Penicillins Nausea And Vomiting    Raloxifene Nausea Only    Ropinirole Nausea Only    Sulfa (sulfonamide antibiotics) Nausea And Vomiting       Past medical, family, and social history reviewed as documented in chart with pertinent positive medical, family, and social history detailed in HPI.    Physical  Exam  Vitals:    07/12/24 1125   BP: (!) 145/85   Pulse: 78   Resp: 18         General: Well-developed, Well Nourished in No acute distress  HEENT: Normocephalic, Atraumatic, Extraocular Movements intact  Neck: Supple, trachea midline, no cervical or supraclavicular lymphadenopathy  Respirations: Even and unlabored  Back: midline spine, no CVA tenderness  Abd: soft, NTND, no masses, reducible supraumbilical hernia  Extremities: Moves all equally, atraumatic, no clubbing, cyanosis or edema  Skin: warm and dry  Psych: normal affect  Neuro: Alert and oriented x 3, Cranial nerves II-XII grossly intact    UA: negative for blood    Creatinine  3/4/24: 0.89        Assesment:   1. Renal cell carcinoma of left kidney  POCT Urinalysis, Dipstick, Automated, W/O Scope    US Retroperitoneal Complete      2. Gross hematuria  POCT Urinalysis, Dipstick, Automated, W/O Scope      3. Urinary urgency                Plan:  Renal cell carcinoma of left kidney  -     POCT Urinalysis, Dipstick, Automated, W/O Scope  -     US Retroperitoneal Complete; Future; Expected date: 07/12/2025    Gross hematuria  -     POCT Urinalysis, Dipstick, Automated, W/O Scope    Urinary urgency             Follow up in about 1 year (around 7/12/2025).

## 2024-12-30 ENCOUNTER — TELEPHONE (OUTPATIENT)
Dept: UROLOGY | Facility: CLINIC | Age: 84
End: 2024-12-30
Payer: MEDICARE

## 2024-12-30 ENCOUNTER — LAB VISIT (OUTPATIENT)
Dept: LAB | Facility: HOSPITAL | Age: 84
End: 2024-12-30
Attending: UROLOGY
Payer: MEDICARE

## 2024-12-30 DIAGNOSIS — N39.0 URINARY TRACT INFECTION WITHOUT HEMATURIA, SITE UNSPECIFIED: ICD-10-CM

## 2024-12-30 DIAGNOSIS — N39.0 URINARY TRACT INFECTION WITHOUT HEMATURIA, SITE UNSPECIFIED: Primary | ICD-10-CM

## 2024-12-30 LAB
BILIRUB UR QL STRIP: NEGATIVE
CLARITY UR REFRACT.AUTO: ABNORMAL
COLOR UR AUTO: YELLOW
GLUCOSE UR QL STRIP: NEGATIVE
HGB UR QL STRIP: NEGATIVE
KETONES UR QL STRIP: NEGATIVE
LEUKOCYTE ESTERASE UR QL STRIP: NEGATIVE
NITRITE UR QL STRIP: NEGATIVE
PH UR STRIP: 5 [PH] (ref 5–8)
PROT UR QL STRIP: NEGATIVE
SP GR UR STRIP: 1.01 (ref 1–1.03)
URN SPEC COLLECT METH UR: ABNORMAL

## 2024-12-30 PROCEDURE — 81003 URINALYSIS AUTO W/O SCOPE: CPT | Performed by: UROLOGY

## 2024-12-30 PROCEDURE — 87086 URINE CULTURE/COLONY COUNT: CPT | Performed by: UROLOGY

## 2025-01-01 LAB
BACTERIA UR CULT: NORMAL
BACTERIA UR CULT: NORMAL

## 2025-05-30 ENCOUNTER — TELEPHONE (OUTPATIENT)
Dept: UROLOGY | Facility: CLINIC | Age: 85
End: 2025-05-30
Payer: MEDICARE

## 2025-05-30 NOTE — TELEPHONE ENCOUNTER
Notified pt that her appt w/ the provider will have top be rescheduled due to the doctor not being in office. I asked the pt if she felt as though she needed to be seen earlier, to which she declined. I offered her 9/13, the pt declined due to having other appts on that day. I then offered her 9/16, pt accepted the appt and requested that her ultrasound be moved to that day as well. Understanding was verbalized.